# Patient Record
Sex: FEMALE | Race: BLACK OR AFRICAN AMERICAN | NOT HISPANIC OR LATINO
[De-identification: names, ages, dates, MRNs, and addresses within clinical notes are randomized per-mention and may not be internally consistent; named-entity substitution may affect disease eponyms.]

---

## 2020-05-29 PROBLEM — Z00.00 ENCOUNTER FOR PREVENTIVE HEALTH EXAMINATION: Status: ACTIVE | Noted: 2020-05-29

## 2020-06-04 ENCOUNTER — LABORATORY RESULT (OUTPATIENT)
Age: 40
End: 2020-06-04

## 2020-06-05 ENCOUNTER — APPOINTMENT (OUTPATIENT)
Dept: PULMONOLOGY | Facility: CLINIC | Age: 40
End: 2020-06-05
Payer: COMMERCIAL

## 2020-06-05 VITALS
WEIGHT: 196 LBS | BODY MASS INDEX: 32.65 KG/M2 | OXYGEN SATURATION: 98 % | TEMPERATURE: 99 F | HEART RATE: 76 BPM | SYSTOLIC BLOOD PRESSURE: 112 MMHG | HEIGHT: 65 IN | DIASTOLIC BLOOD PRESSURE: 81 MMHG

## 2020-06-05 DIAGNOSIS — J45.40 MODERATE PERSISTENT ASTHMA, UNCOMPLICATED: ICD-10-CM

## 2020-06-05 PROCEDURE — 99204 OFFICE O/P NEW MOD 45 MIN: CPT

## 2020-06-05 NOTE — ASSESSMENT
[FreeTextEntry1] : moderate persistent asthma\par \par Doing well on Breo with rare GEORGE use.  May be adequate for now.  Get IgE, check eosinophils on CBC. Advised to avoid all NSAIDs.  Foolwed by allergist, has had skin testing but no blood work recently.  [facility]

## 2020-06-05 NOTE — HISTORY OF PRESENT ILLNESS
[TextBox_4] : 39 yr old F her for evaluation of asthma.  First dx asthma about 5 yrs ago. Admitted on 2 occasions for about a week over past two years. Last steroid use 2 months ago after seen at urgent care center. Has been using GEORGE (albuterol) alone, until past month has been using Breo daily- since then has needed much less frequent albuterol.\par \par Has had allergy evaluation, told of allergies to mold, roaches, dogs (mild)- has a dog.  Possible anaphylactic rxn to Meloxicam, assumed allergic to NSAIDs.  Has nebulizer at home, no recent use. \par \par Recently having back pain, both low back to L leg and upper back, following a fall.

## 2020-06-05 NOTE — PHYSICAL EXAM
[No Acute Distress] : no acute distress [Normal Oropharynx] : normal oropharynx [No Neck Mass] : no neck mass [Normal Appearance] : normal appearance [Normal S1, S2] : normal s1, s2 [Normal Rate/Rhythm] : normal rate/rhythm [No Murmurs] : no murmurs [No Resp Distress] : no resp distress [Clear to Auscultation Bilaterally] : clear to auscultation bilaterally [No Abnormalities] : no abnormalities [Normal Gait] : normal gait [Benign] : benign [No Clubbing] : no clubbing [No Cyanosis] : no cyanosis [No Edema] : no edema [FROM] : FROM [Normal Color/ Pigmentation] : normal color/ pigmentation [No Focal Deficits] : no focal deficits [Normal Affect] : normal affect [Oriented x3] : oriented x3 [TextBox_68] : wheeze forced expiration only

## 2020-06-08 ENCOUNTER — TRANSCRIPTION ENCOUNTER (OUTPATIENT)
Age: 40
End: 2020-06-08

## 2020-06-09 LAB
BASOPHILS # BLD AUTO: 0 K/UL
BASOPHILS NFR BLD AUTO: 0 %
EOSINOPHIL # BLD AUTO: 0.72 K/UL
EOSINOPHIL NFR BLD AUTO: 8.8 %
HCT VFR BLD CALC: 36 %
HGB BLD-MCNC: 10.2 G/DL
LYMPHOCYTES # BLD AUTO: 2.66 K/UL
LYMPHOCYTES NFR BLD AUTO: 32.4 %
MAN DIFF?: NORMAL
MCHC RBC-ENTMCNC: 20.9 PG
MCHC RBC-ENTMCNC: 28.3 GM/DL
MCV RBC AUTO: 73.6 FL
MONOCYTES # BLD AUTO: 0.36 K/UL
MONOCYTES NFR BLD AUTO: 4.4 %
NEUTROPHILS # BLD AUTO: 4.47 K/UL
NEUTROPHILS NFR BLD AUTO: 54.4 %
PLATELET # BLD AUTO: 584 K/UL
RBC # BLD: 4.89 M/UL
RBC # FLD: 20.3 %
WBC # FLD AUTO: 8.21 K/UL

## 2020-06-10 LAB — IGE AB SERPL QL: 28 NG/ML

## 2020-06-17 ENCOUNTER — APPOINTMENT (OUTPATIENT)
Dept: SPINE | Facility: CLINIC | Age: 40
End: 2020-06-17
Payer: COMMERCIAL

## 2020-06-17 ENCOUNTER — APPOINTMENT (OUTPATIENT)
Dept: SPINE | Facility: CLINIC | Age: 40
End: 2020-06-17

## 2020-06-17 VITALS
DIASTOLIC BLOOD PRESSURE: 85 MMHG | WEIGHT: 200 LBS | OXYGEN SATURATION: 99 % | HEART RATE: 84 BPM | BODY MASS INDEX: 33.32 KG/M2 | SYSTOLIC BLOOD PRESSURE: 118 MMHG | HEIGHT: 65 IN

## 2020-06-17 PROCEDURE — 99243 OFF/OP CNSLTJ NEW/EST LOW 30: CPT | Mod: 57

## 2020-06-29 ENCOUNTER — APPOINTMENT (OUTPATIENT)
Dept: SPINE | Facility: HOSPITAL | Age: 40
End: 2020-06-29

## 2021-03-09 ENCOUNTER — NON-APPOINTMENT (OUTPATIENT)
Age: 41
End: 2021-03-09

## 2021-03-09 ENCOUNTER — APPOINTMENT (OUTPATIENT)
Dept: SPINE | Facility: CLINIC | Age: 41
End: 2021-03-09
Payer: MEDICAID

## 2021-03-09 ENCOUNTER — APPOINTMENT (OUTPATIENT)
Dept: SPINE | Facility: CLINIC | Age: 41
End: 2021-03-09

## 2021-03-09 VITALS
TEMPERATURE: 98.2 F | OXYGEN SATURATION: 98 % | WEIGHT: 220 LBS | DIASTOLIC BLOOD PRESSURE: 80 MMHG | SYSTOLIC BLOOD PRESSURE: 121 MMHG | BODY MASS INDEX: 36.65 KG/M2 | HEIGHT: 65 IN | HEART RATE: 77 BPM | RESPIRATION RATE: 14 BRPM

## 2021-03-09 DIAGNOSIS — M54.16 RADICULOPATHY, LUMBAR REGION: ICD-10-CM

## 2021-03-09 DIAGNOSIS — Z01.818 ENCOUNTER FOR OTHER PREPROCEDURAL EXAMINATION: ICD-10-CM

## 2021-03-09 PROCEDURE — 99214 OFFICE O/P EST MOD 30 MIN: CPT

## 2021-03-09 PROCEDURE — 99072 ADDL SUPL MATRL&STAF TM PHE: CPT

## 2021-03-11 PROBLEM — M54.16 LEFT LUMBAR RADICULOPATHY: Status: ACTIVE | Noted: 2020-06-16

## 2021-03-30 DIAGNOSIS — M54.12 RADICULOPATHY, CERVICAL REGION: ICD-10-CM

## 2021-04-04 ENCOUNTER — OUTPATIENT (OUTPATIENT)
Dept: OUTPATIENT SERVICES | Facility: HOSPITAL | Age: 41
LOS: 1 days | End: 2021-04-04
Payer: COMMERCIAL

## 2021-04-04 ENCOUNTER — APPOINTMENT (OUTPATIENT)
Dept: MRI IMAGING | Facility: HOSPITAL | Age: 41
End: 2021-04-04

## 2021-04-04 PROCEDURE — 72141 MRI NECK SPINE W/O DYE: CPT | Mod: 26

## 2021-04-04 PROCEDURE — 72141 MRI NECK SPINE W/O DYE: CPT

## 2021-04-05 ENCOUNTER — NON-APPOINTMENT (OUTPATIENT)
Age: 41
End: 2021-04-05

## 2021-04-05 RX ORDER — FLUTICASONE FUROATE AND VILANTEROL TRIFENATATE 200; 25 UG/1; UG/1
200-25 POWDER RESPIRATORY (INHALATION) DAILY
Qty: 1 | Refills: 3 | Status: ACTIVE | COMMUNITY

## 2021-04-06 ENCOUNTER — NON-APPOINTMENT (OUTPATIENT)
Age: 41
End: 2021-04-06

## 2021-04-06 LAB — SARS-COV-2 N GENE NPH QL NAA+PROBE: NOT DETECTED

## 2021-04-07 ENCOUNTER — TRANSCRIPTION ENCOUNTER (OUTPATIENT)
Age: 41
End: 2021-04-07

## 2021-04-07 VITALS
SYSTOLIC BLOOD PRESSURE: 118 MMHG | TEMPERATURE: 98 F | WEIGHT: 224.87 LBS | HEART RATE: 84 BPM | OXYGEN SATURATION: 100 % | HEIGHT: 65 IN | DIASTOLIC BLOOD PRESSURE: 81 MMHG | RESPIRATION RATE: 16 BRPM

## 2021-04-07 NOTE — ASU PATIENT PROFILE, ADULT - PMH
Anemia    Asthma    COVID-19  dec 2020  Heavy menses    Lumbar disc disorder with myelopathy  herniation  Obesity    Radiculopathy affecting upper extremity    Sciatica

## 2021-04-08 ENCOUNTER — APPOINTMENT (OUTPATIENT)
Dept: SPINE | Facility: HOSPITAL | Age: 41
End: 2021-04-08

## 2021-04-08 ENCOUNTER — INPATIENT (INPATIENT)
Facility: HOSPITAL | Age: 41
LOS: 5 days | Discharge: HOME HEALTH | DRG: 519 | End: 2021-04-14
Attending: NEUROLOGICAL SURGERY | Admitting: NEUROLOGICAL SURGERY
Payer: COMMERCIAL

## 2021-04-08 LAB
ANION GAP SERPL CALC-SCNC: 13 MMOL/L — SIGNIFICANT CHANGE UP (ref 5–17)
BUN SERPL-MCNC: 10 MG/DL — SIGNIFICANT CHANGE UP (ref 7–23)
CALCIUM SERPL-MCNC: 8.5 MG/DL — SIGNIFICANT CHANGE UP (ref 8.4–10.5)
CHLORIDE SERPL-SCNC: 100 MMOL/L — SIGNIFICANT CHANGE UP (ref 96–108)
CO2 SERPL-SCNC: 21 MMOL/L — LOW (ref 22–31)
CREAT SERPL-MCNC: 0.61 MG/DL — SIGNIFICANT CHANGE UP (ref 0.5–1.3)
GLUCOSE BLDC GLUCOMTR-MCNC: 129 MG/DL — HIGH (ref 70–99)
GLUCOSE SERPL-MCNC: 142 MG/DL — HIGH (ref 70–99)
HCT VFR BLD CALC: 37.4 % — SIGNIFICANT CHANGE UP (ref 34.5–45)
HGB BLD-MCNC: 11.6 G/DL — SIGNIFICANT CHANGE UP (ref 11.5–15.5)
MCHC RBC-ENTMCNC: 23.5 PG — LOW (ref 27–34)
MCHC RBC-ENTMCNC: 31 GM/DL — LOW (ref 32–36)
MCV RBC AUTO: 75.7 FL — LOW (ref 80–100)
NRBC # BLD: 0 /100 WBCS — SIGNIFICANT CHANGE UP (ref 0–0)
PLATELET # BLD AUTO: 469 K/UL — HIGH (ref 150–400)
POTASSIUM SERPL-MCNC: 4.1 MMOL/L — SIGNIFICANT CHANGE UP (ref 3.5–5.3)
POTASSIUM SERPL-SCNC: 4.1 MMOL/L — SIGNIFICANT CHANGE UP (ref 3.5–5.3)
RBC # BLD: 4.94 M/UL — SIGNIFICANT CHANGE UP (ref 3.8–5.2)
RBC # FLD: 17.9 % — HIGH (ref 10.3–14.5)
SODIUM SERPL-SCNC: 134 MMOL/L — LOW (ref 135–145)
WBC # BLD: 18.93 K/UL — HIGH (ref 3.8–10.5)
WBC # FLD AUTO: 18.93 K/UL — HIGH (ref 3.8–10.5)

## 2021-04-08 PROCEDURE — 99024 POSTOP FOLLOW-UP VISIT: CPT

## 2021-04-08 PROCEDURE — 63047 LAM FACETEC & FORAMOT LUMBAR: CPT | Mod: 22

## 2021-04-08 RX ORDER — DEXTROSE 50 % IN WATER 50 %
25 SYRINGE (ML) INTRAVENOUS ONCE
Refills: 0 | Status: DISCONTINUED | OUTPATIENT
Start: 2021-04-08 | End: 2021-04-09

## 2021-04-08 RX ORDER — METHOCARBAMOL 500 MG/1
500 TABLET, FILM COATED ORAL EVERY 8 HOURS
Refills: 0 | Status: DISCONTINUED | OUTPATIENT
Start: 2021-04-08 | End: 2021-04-09

## 2021-04-08 RX ORDER — CHLORHEXIDINE GLUCONATE 213 G/1000ML
1 SOLUTION TOPICAL ONCE
Refills: 0 | Status: DISCONTINUED | OUTPATIENT
Start: 2021-04-08 | End: 2021-04-09

## 2021-04-08 RX ORDER — DEXAMETHASONE 0.5 MG/5ML
4 ELIXIR ORAL EVERY 6 HOURS
Refills: 0 | Status: COMPLETED | OUTPATIENT
Start: 2021-04-08 | End: 2021-04-11

## 2021-04-08 RX ORDER — SODIUM CHLORIDE 9 MG/ML
1000 INJECTION INTRAMUSCULAR; INTRAVENOUS; SUBCUTANEOUS
Refills: 0 | Status: DISCONTINUED | OUTPATIENT
Start: 2021-04-08 | End: 2021-04-09

## 2021-04-08 RX ORDER — MAGNESIUM HYDROXIDE 400 MG/1
30 TABLET, CHEWABLE ORAL ONCE
Refills: 0 | Status: DISCONTINUED | OUTPATIENT
Start: 2021-04-08 | End: 2021-04-09

## 2021-04-08 RX ORDER — INSULIN LISPRO 100/ML
VIAL (ML) SUBCUTANEOUS ONCE
Refills: 0 | Status: COMPLETED | OUTPATIENT
Start: 2021-04-08 | End: 2021-04-08

## 2021-04-08 RX ORDER — ACETAMINOPHEN 500 MG
1000 TABLET ORAL ONCE
Refills: 0 | Status: COMPLETED | OUTPATIENT
Start: 2021-04-08 | End: 2021-04-08

## 2021-04-08 RX ORDER — HYDROMORPHONE HYDROCHLORIDE 2 MG/ML
0.25 INJECTION INTRAMUSCULAR; INTRAVENOUS; SUBCUTANEOUS
Refills: 0 | Status: DISCONTINUED | OUTPATIENT
Start: 2021-04-08 | End: 2021-04-09

## 2021-04-08 RX ORDER — SENNA PLUS 8.6 MG/1
2 TABLET ORAL ONCE
Refills: 0 | Status: DISCONTINUED | OUTPATIENT
Start: 2021-04-08 | End: 2021-04-09

## 2021-04-08 RX ORDER — POVIDONE-IODINE 5 %
1 AEROSOL (ML) TOPICAL ONCE
Refills: 0 | Status: DISCONTINUED | OUTPATIENT
Start: 2021-04-08 | End: 2021-04-14

## 2021-04-08 RX ORDER — CELECOXIB 200 MG/1
200 CAPSULE ORAL ONCE
Refills: 0 | Status: COMPLETED | OUTPATIENT
Start: 2021-04-08 | End: 2021-04-08

## 2021-04-08 RX ORDER — DEXTROSE 50 % IN WATER 50 %
15 SYRINGE (ML) INTRAVENOUS ONCE
Refills: 0 | Status: DISCONTINUED | OUTPATIENT
Start: 2021-04-08 | End: 2021-04-09

## 2021-04-08 RX ORDER — OXYCODONE HYDROCHLORIDE 5 MG/1
5 TABLET ORAL EVERY 4 HOURS
Refills: 0 | Status: DISCONTINUED | OUTPATIENT
Start: 2021-04-08 | End: 2021-04-14

## 2021-04-08 RX ORDER — ALBUTEROL 90 UG/1
2 AEROSOL, METERED ORAL ONCE
Refills: 0 | Status: DISCONTINUED | OUTPATIENT
Start: 2021-04-08 | End: 2021-04-09

## 2021-04-08 RX ORDER — OXYCODONE HYDROCHLORIDE 5 MG/1
10 TABLET ORAL EVERY 4 HOURS
Refills: 0 | Status: DISCONTINUED | OUTPATIENT
Start: 2021-04-08 | End: 2021-04-14

## 2021-04-08 RX ORDER — GABAPENTIN 400 MG/1
300 CAPSULE ORAL ONCE
Refills: 0 | Status: COMPLETED | OUTPATIENT
Start: 2021-04-08 | End: 2021-04-08

## 2021-04-08 RX ORDER — DEXTROSE 50 % IN WATER 50 %
12.5 SYRINGE (ML) INTRAVENOUS ONCE
Refills: 0 | Status: DISCONTINUED | OUTPATIENT
Start: 2021-04-08 | End: 2021-04-09

## 2021-04-08 RX ORDER — APREPITANT 80 MG/1
40 CAPSULE ORAL ONCE
Refills: 0 | Status: COMPLETED | OUTPATIENT
Start: 2021-04-08 | End: 2021-04-08

## 2021-04-08 RX ORDER — BUDESONIDE AND FORMOTEROL FUMARATE DIHYDRATE 160; 4.5 UG/1; UG/1
2 AEROSOL RESPIRATORY (INHALATION) ONCE
Refills: 0 | Status: DISCONTINUED | OUTPATIENT
Start: 2021-04-08 | End: 2021-04-09

## 2021-04-08 RX ORDER — LORATADINE 10 MG/1
10 TABLET ORAL ONCE
Refills: 0 | Status: DISCONTINUED | OUTPATIENT
Start: 2021-04-08 | End: 2021-04-09

## 2021-04-08 RX ORDER — CEFAZOLIN SODIUM 1 G
2000 VIAL (EA) INJECTION ONCE
Refills: 0 | Status: COMPLETED | OUTPATIENT
Start: 2021-04-08 | End: 2021-04-08

## 2021-04-08 RX ORDER — GLUCAGON INJECTION, SOLUTION 0.5 MG/.1ML
1 INJECTION, SOLUTION SUBCUTANEOUS ONCE
Refills: 0 | Status: DISCONTINUED | OUTPATIENT
Start: 2021-04-08 | End: 2021-04-09

## 2021-04-08 RX ORDER — SODIUM CHLORIDE 9 MG/ML
1000 INJECTION, SOLUTION INTRAVENOUS
Refills: 0 | Status: DISCONTINUED | OUTPATIENT
Start: 2021-04-08 | End: 2021-04-09

## 2021-04-08 RX ORDER — PANTOPRAZOLE SODIUM 20 MG/1
40 TABLET, DELAYED RELEASE ORAL ONCE
Refills: 0 | Status: DISCONTINUED | OUTPATIENT
Start: 2021-04-08 | End: 2021-04-09

## 2021-04-08 RX ORDER — ONDANSETRON 8 MG/1
4 TABLET, FILM COATED ORAL EVERY 6 HOURS
Refills: 0 | Status: DISCONTINUED | OUTPATIENT
Start: 2021-04-08 | End: 2021-04-14

## 2021-04-08 RX ORDER — BUPIVACAINE 13.3 MG/ML
20 INJECTION, SUSPENSION, LIPOSOMAL INFILTRATION ONCE
Refills: 0 | Status: DISCONTINUED | OUTPATIENT
Start: 2021-04-08 | End: 2021-04-14

## 2021-04-08 RX ADMIN — ONDANSETRON 4 MILLIGRAM(S): 8 TABLET, FILM COATED ORAL at 17:45

## 2021-04-08 RX ADMIN — Medication 100 MILLIGRAM(S): at 15:32

## 2021-04-08 RX ADMIN — APREPITANT 40 MILLIGRAM(S): 80 CAPSULE ORAL at 07:18

## 2021-04-08 RX ADMIN — OXYCODONE HYDROCHLORIDE 10 MILLIGRAM(S): 5 TABLET ORAL at 12:50

## 2021-04-08 RX ADMIN — Medication 50 MILLIGRAM(S): at 21:45

## 2021-04-08 RX ADMIN — Medication 1000 MILLIGRAM(S): at 18:15

## 2021-04-08 RX ADMIN — ONDANSETRON 4 MILLIGRAM(S): 8 TABLET, FILM COATED ORAL at 23:49

## 2021-04-08 RX ADMIN — METHOCARBAMOL 500 MILLIGRAM(S): 500 TABLET, FILM COATED ORAL at 21:45

## 2021-04-08 RX ADMIN — Medication 4 MILLIGRAM(S): at 14:01

## 2021-04-08 RX ADMIN — OXYCODONE HYDROCHLORIDE 10 MILLIGRAM(S): 5 TABLET ORAL at 23:49

## 2021-04-08 RX ADMIN — Medication 0: at 12:08

## 2021-04-08 RX ADMIN — Medication 50 MILLIGRAM(S): at 13:59

## 2021-04-08 RX ADMIN — Medication 4 MILLIGRAM(S): at 19:32

## 2021-04-08 RX ADMIN — GABAPENTIN 300 MILLIGRAM(S): 400 CAPSULE ORAL at 07:17

## 2021-04-08 RX ADMIN — Medication 1000 MILLIGRAM(S): at 17:45

## 2021-04-08 NOTE — H&P ADULT - NSHPPHYSICALEXAM_GEN_ALL_CORE
Constitutional:  41 y/o female awake, alert in no acute distress.  Eyes:  Sclera anicteric, conjunctiva noninjected.  ENMT: Oropharyngeal mucosa moist, pink. Tongue midline.    Neck: Neck supple, FROM.  No appreciable lymphadenopathy.  Back:  No pain to palpation/percussion of low back. No CVA tenderness.  Respiratory: Clear to auscultation bilaterally.  No rales, rhonchi, wheezes.  Cardiovascular: Regular rate and rhythm.  S1, S2 heard.  Gastrointestinal:  Soft, nontender, nondistended.  +BS.  Genitourinary:  Deferred.  Rectal: Deferred.  Vascular: Extremities warm, no ulcers, no discoloration of skin.   Neurological: Gen: AA&O x 3, conversant, appropriate.      CN II-XII grossly intact.    Motor: MENDEZ x 4, 5/5 throughout UE/LE.    Sens: Sensation intact to light touch throughout.    Hoffmans negative bilaterally.  Plantar downgoing bilaterally.  No clonus.      No pronator drift, no dysmetria.  Skin: Warm, dry, no erythema.

## 2021-04-08 NOTE — H&P ADULT - NSHPSOCIALHISTORY_GEN_ALL_CORE
Denies smoking cigarettes or alcohol use.    Smokes marijuana, last use over 1 week ago.    Has a 22 year old daughter, lives with brother.

## 2021-04-08 NOTE — H&P ADULT - NSICDXPASTMEDICALHX_GEN_ALL_CORE_FT
PAST MEDICAL HISTORY:  Anemia     Asthma     COVID-19 dec 2020    Heavy menses     Lumbar disc disorder with myelopathy herniation    Obesity     Radiculopathy affecting upper extremity     Sciatica

## 2021-04-08 NOTE — H&P ADULT - HISTORY OF PRESENT ILLNESS
41 y/o female with PMHx Asthma presents for spine surgery today.  Her history includes slip and fall 1/2020 with resulting low back pain and left leg pain managed conservatively.  In 9/2020 she started with right leg pain that has been worsening. 41 y/o female with PMHx Asthma presents for spine surgery today.  Her history includes slip and fall 1/2020 with resulting low back pain and left leg pain managed conservatively.  At that time she had a large L5/S1 disc herniation, which has resolved.  In 9/2020 she started with right leg pain that has been worsening.  She reports right greater than left leg pain.  She denies bowel or bladder changes, saddle anesthesia.  She denies weakness, numbness.

## 2021-04-08 NOTE — PROGRESS NOTE ADULT - SUBJECTIVE AND OBJECTIVE BOX
NEUROSURGERY POST OP NOTE:    POD# 0 S/P L4-5 lumbar laminectomy with discectomy    S: patient evaluated at bedside, b/l LE improved after surgery. C/o low back pain, non radiating. Denies any new numbness, weakness, pain, nausea, CP, SOB.       T(C): 36 (04-08-21 @ 10:42), Max: 36 (04-08-21 @ 10:42)  HR: 62 (04-08-21 @ 15:30) (54 - 71)  BP: 102/60 (04-08-21 @ 15:30) (94/52 - 985/-)  RR: 25 (04-08-21 @ 15:30) (10 - 25)  SpO2: 100% (04-08-21 @ 15:30) (98% - 100%)        ALBUTerol    90 MICROgram(s) HFA Inhaler 2 Puff(s) Inhalation Once PRN  budesonide 160 MICROgram(s)/formoterol 4.5 MICROgram(s) Inhaler 2 Puff(s) Inhalation Once  BUpivacaine liposome 1.3% Injectable (no eMAR) 20 milliLiter(s) Local Injection Once  chlorhexidine 2% Cloths 1 Application(s) Topical once  dexAMETHasone  Injectable 4 milliGRAM(s) IV Push every 6 hours  dextrose 40% Gel 15 Gram(s) Oral Once  dextrose 5%. 1000 milliLiter(s) IV Continuous <Continuous>  dextrose 5%. 1000 milliLiter(s) IV Continuous <Continuous>  dextrose 50% Injectable 25 Gram(s) IV Push Once  dextrose 50% Injectable 12.5 Gram(s) IV Push Once  dextrose 50% Injectable 25 Gram(s) IV Push Once  glucagon  Injectable 1 milliGRAM(s) IntraMuscular Once  HYDROmorphone  Injectable 0.25 milliGRAM(s) IV Push every 3 hours PRN  loratadine 10 milliGRAM(s) Oral Once  magnesium hydroxide Suspension 30 milliLiter(s) Oral Once PRN  multivitamin 1 Tablet(s) Oral Once  ondansetron   Disintegrating Tablet 4 milliGRAM(s) Oral every 6 hours  oxyCODONE    IR 5 milliGRAM(s) Oral every 4 hours PRN  oxyCODONE    IR 10 milliGRAM(s) Oral every 4 hours PRN  pantoprazole    Tablet 40 milliGRAM(s) Oral Once  povidone iodine 5% Nasal Swab 1 Application(s) Both Nostrils once  pregabalin 50 milliGRAM(s) Oral three times a day  senna 2 Tablet(s) Oral Once  sodium chloride 0.9%. 1000 milliLiter(s) IV Continuous <Continuous>      RADIOLOGY:     Exam:  Gen: laying in hospital bed, NAD  Neuro: AA+Ox3, OE spont, FC  CN II-XII: PERRL, EOMI  Motor: MAEx4, b/l hip flexion 3/5, b/l KF/PF/DF/EHl 5/5, b/l uppers 5/5 strength  sensation intact x4 ext  Cardiac: RRR  Pulm: CTAB  GI: soft, NT/ND  lumbar site midline C/D/I with clean dressing         Assessment:   39yo Female PMHx asthma, POD# 0 S/P L4-5 lumbar laminectomy with discectomy    Plan:  - neuro checks  - vitals checks  - pain control  - Decadron x3 days, DC on medrol dose pack  - ADAT  - bowel regimen  - GI ppx: protonix  - NS at 100  - DVT ppx: SCD's    d/w Dr. Ayers

## 2021-04-08 NOTE — BRIEF OPERATIVE NOTE - NSICDXBRIEFPROCEDURE_GEN_ALL_CORE_FT
PROCEDURES:  Lumbar laminectomy with discectomy by posterior approach 08-Apr-2021 10:28:21  Benjie Machado

## 2021-04-08 NOTE — CHART NOTE - NSCHARTNOTEFT_GEN_A_CORE
Neurosurgical Indications for Screening Dopplers on Admission:       1) Known hypercoagulation disorder (h/o VTE, thrombophilia, HIT, etc.)   2) Admitted from prolonged stay from another institution (straight forward ER transfers not included)  3) Presenting with significant leg immobility   4) Presenting with signs and symptoms of VTE?    5) With significant critical illness, Including "found down" for unknown period of time in HPI  6) With significant neurotrauma (TBI, SCI / TLS spine fractures)   7) Who are comatose   8) With known malignancy (e.g. glioblastoma multiforme, meningioma, etc.). Excludes IA chemo 23hr observation stays  9) On hemodialysis   10) Who have received platelet transfusion or antithrombotic reversal agents recently   11) Who have had recent major orthopedic surgery      "NO" to all 11.    Screening dopplers indicated?   Y _   N x    DVT Prophylaxis:  x SCD's   _ chemoprophylaxis    All above d/w attending.

## 2021-04-08 NOTE — H&P ADULT - ASSESSMENT
38 y/o female with PMHx Asthma with right lumbar radiculopathy for L4-5 Laminectomy/discectomy today:    - NPO  - 3M  - periop antibiotics  - admit for neuromonitoring postop  - scds    All above d/w Dr. Ayers.

## 2021-04-08 NOTE — PACU DISCHARGE NOTE - COMMENTS
Verbal report given to ALEE Loya, v/kia coates, pt, to be transferred to room 837, voicing no complaints.

## 2021-04-09 ENCOUNTER — TRANSCRIPTION ENCOUNTER (OUTPATIENT)
Age: 41
End: 2021-04-09

## 2021-04-09 LAB
A1C WITH ESTIMATED AVERAGE GLUCOSE RESULT: 5.6 % — SIGNIFICANT CHANGE UP (ref 4–5.6)
ANION GAP SERPL CALC-SCNC: 10 MMOL/L — SIGNIFICANT CHANGE UP (ref 5–17)
BUN SERPL-MCNC: 8 MG/DL — SIGNIFICANT CHANGE UP (ref 7–23)
CALCIUM SERPL-MCNC: 8.6 MG/DL — SIGNIFICANT CHANGE UP (ref 8.4–10.5)
CHLORIDE SERPL-SCNC: 102 MMOL/L — SIGNIFICANT CHANGE UP (ref 96–108)
CO2 SERPL-SCNC: 24 MMOL/L — SIGNIFICANT CHANGE UP (ref 22–31)
COVID-19 SPIKE DOMAIN AB INTERP: POSITIVE
COVID-19 SPIKE DOMAIN ANTIBODY RESULT: 33 U/ML — HIGH
CREAT SERPL-MCNC: 0.64 MG/DL — SIGNIFICANT CHANGE UP (ref 0.5–1.3)
ESTIMATED AVERAGE GLUCOSE: 114 MG/DL — SIGNIFICANT CHANGE UP (ref 68–114)
GLUCOSE SERPL-MCNC: 117 MG/DL — HIGH (ref 70–99)
HCT VFR BLD CALC: 35.9 % — SIGNIFICANT CHANGE UP (ref 34.5–45)
HGB BLD-MCNC: 11.1 G/DL — LOW (ref 11.5–15.5)
MAGNESIUM SERPL-MCNC: 2 MG/DL — SIGNIFICANT CHANGE UP (ref 1.6–2.6)
MCHC RBC-ENTMCNC: 23.4 PG — LOW (ref 27–34)
MCHC RBC-ENTMCNC: 30.9 GM/DL — LOW (ref 32–36)
MCV RBC AUTO: 75.7 FL — LOW (ref 80–100)
NRBC # BLD: 0 /100 WBCS — SIGNIFICANT CHANGE UP (ref 0–0)
PHOSPHATE SERPL-MCNC: 3.2 MG/DL — SIGNIFICANT CHANGE UP (ref 2.5–4.5)
PLATELET # BLD AUTO: 448 K/UL — HIGH (ref 150–400)
POTASSIUM SERPL-MCNC: 4.3 MMOL/L — SIGNIFICANT CHANGE UP (ref 3.5–5.3)
POTASSIUM SERPL-SCNC: 4.3 MMOL/L — SIGNIFICANT CHANGE UP (ref 3.5–5.3)
RBC # BLD: 4.74 M/UL — SIGNIFICANT CHANGE UP (ref 3.8–5.2)
RBC # FLD: 18.1 % — HIGH (ref 10.3–14.5)
SARS-COV-2 IGG+IGM SERPL QL IA: 33 U/ML — HIGH
SARS-COV-2 IGG+IGM SERPL QL IA: POSITIVE
SODIUM SERPL-SCNC: 136 MMOL/L — SIGNIFICANT CHANGE UP (ref 135–145)
WBC # BLD: 18.97 K/UL — HIGH (ref 3.8–10.5)
WBC # FLD AUTO: 18.97 K/UL — HIGH (ref 3.8–10.5)

## 2021-04-09 PROCEDURE — 99024 POSTOP FOLLOW-UP VISIT: CPT

## 2021-04-09 RX ORDER — SENNA PLUS 8.6 MG/1
2 TABLET ORAL DAILY
Refills: 0 | Status: DISCONTINUED | OUTPATIENT
Start: 2021-04-09 | End: 2021-04-14

## 2021-04-09 RX ORDER — PANTOPRAZOLE SODIUM 20 MG/1
40 TABLET, DELAYED RELEASE ORAL DAILY
Refills: 0 | Status: DISCONTINUED | OUTPATIENT
Start: 2021-04-09 | End: 2021-04-14

## 2021-04-09 RX ORDER — METHOCARBAMOL 500 MG/1
750 TABLET, FILM COATED ORAL EVERY 8 HOURS
Refills: 0 | Status: DISCONTINUED | OUTPATIENT
Start: 2021-04-09 | End: 2021-04-14

## 2021-04-09 RX ORDER — HYDROMORPHONE HYDROCHLORIDE 2 MG/ML
0.5 INJECTION INTRAMUSCULAR; INTRAVENOUS; SUBCUTANEOUS
Refills: 0 | Status: DISCONTINUED | OUTPATIENT
Start: 2021-04-09 | End: 2021-04-14

## 2021-04-09 RX ORDER — ALBUTEROL 90 UG/1
2 AEROSOL, METERED ORAL EVERY 6 HOURS
Refills: 0 | Status: DISCONTINUED | OUTPATIENT
Start: 2021-04-09 | End: 2021-04-14

## 2021-04-09 RX ORDER — BUDESONIDE AND FORMOTEROL FUMARATE DIHYDRATE 160; 4.5 UG/1; UG/1
2 AEROSOL RESPIRATORY (INHALATION) DAILY
Refills: 0 | Status: DISCONTINUED | OUTPATIENT
Start: 2021-04-09 | End: 2021-04-14

## 2021-04-09 RX ORDER — ENOXAPARIN SODIUM 100 MG/ML
40 INJECTION SUBCUTANEOUS
Refills: 0 | Status: DISCONTINUED | OUTPATIENT
Start: 2021-04-09 | End: 2021-04-14

## 2021-04-09 RX ORDER — LORATADINE 10 MG/1
10 TABLET ORAL DAILY
Refills: 0 | Status: DISCONTINUED | OUTPATIENT
Start: 2021-04-09 | End: 2021-04-14

## 2021-04-09 RX ORDER — MAGNESIUM HYDROXIDE 400 MG/1
30 TABLET, CHEWABLE ORAL DAILY
Refills: 0 | Status: DISCONTINUED | OUTPATIENT
Start: 2021-04-09 | End: 2021-04-12

## 2021-04-09 RX ORDER — ENOXAPARIN SODIUM 100 MG/ML
40 INJECTION SUBCUTANEOUS EVERY 24 HOURS
Refills: 0 | Status: DISCONTINUED | OUTPATIENT
Start: 2021-04-09 | End: 2021-04-09

## 2021-04-09 RX ADMIN — Medication 4 MILLIGRAM(S): at 01:17

## 2021-04-09 RX ADMIN — ENOXAPARIN SODIUM 40 MILLIGRAM(S): 100 INJECTION SUBCUTANEOUS at 21:36

## 2021-04-09 RX ADMIN — OXYCODONE HYDROCHLORIDE 10 MILLIGRAM(S): 5 TABLET ORAL at 17:51

## 2021-04-09 RX ADMIN — OXYCODONE HYDROCHLORIDE 10 MILLIGRAM(S): 5 TABLET ORAL at 23:03

## 2021-04-09 RX ADMIN — Medication 50 MILLIGRAM(S): at 21:36

## 2021-04-09 RX ADMIN — Medication 1 TABLET(S): at 12:57

## 2021-04-09 RX ADMIN — OXYCODONE HYDROCHLORIDE 10 MILLIGRAM(S): 5 TABLET ORAL at 18:27

## 2021-04-09 RX ADMIN — ONDANSETRON 4 MILLIGRAM(S): 8 TABLET, FILM COATED ORAL at 05:26

## 2021-04-09 RX ADMIN — LORATADINE 10 MILLIGRAM(S): 10 TABLET ORAL at 12:58

## 2021-04-09 RX ADMIN — OXYCODONE HYDROCHLORIDE 5 MILLIGRAM(S): 5 TABLET ORAL at 12:58

## 2021-04-09 RX ADMIN — OXYCODONE HYDROCHLORIDE 10 MILLIGRAM(S): 5 TABLET ORAL at 08:10

## 2021-04-09 RX ADMIN — OXYCODONE HYDROCHLORIDE 10 MILLIGRAM(S): 5 TABLET ORAL at 00:49

## 2021-04-09 RX ADMIN — OXYCODONE HYDROCHLORIDE 10 MILLIGRAM(S): 5 TABLET ORAL at 07:38

## 2021-04-09 RX ADMIN — Medication 50 MILLIGRAM(S): at 05:26

## 2021-04-09 RX ADMIN — PANTOPRAZOLE SODIUM 40 MILLIGRAM(S): 20 TABLET, DELAYED RELEASE ORAL at 12:57

## 2021-04-09 RX ADMIN — Medication 4 MILLIGRAM(S): at 13:01

## 2021-04-09 RX ADMIN — SENNA PLUS 2 TABLET(S): 8.6 TABLET ORAL at 12:58

## 2021-04-09 RX ADMIN — HYDROMORPHONE HYDROCHLORIDE 0.5 MILLIGRAM(S): 2 INJECTION INTRAMUSCULAR; INTRAVENOUS; SUBCUTANEOUS at 10:04

## 2021-04-09 RX ADMIN — Medication 4 MILLIGRAM(S): at 19:13

## 2021-04-09 RX ADMIN — METHOCARBAMOL 750 MILLIGRAM(S): 500 TABLET, FILM COATED ORAL at 13:01

## 2021-04-09 RX ADMIN — BUDESONIDE AND FORMOTEROL FUMARATE DIHYDRATE 2 PUFF(S): 160; 4.5 AEROSOL RESPIRATORY (INHALATION) at 12:57

## 2021-04-09 RX ADMIN — ONDANSETRON 4 MILLIGRAM(S): 8 TABLET, FILM COATED ORAL at 17:51

## 2021-04-09 RX ADMIN — METHOCARBAMOL 500 MILLIGRAM(S): 500 TABLET, FILM COATED ORAL at 05:26

## 2021-04-09 RX ADMIN — OXYCODONE HYDROCHLORIDE 5 MILLIGRAM(S): 5 TABLET ORAL at 13:30

## 2021-04-09 RX ADMIN — METHOCARBAMOL 750 MILLIGRAM(S): 500 TABLET, FILM COATED ORAL at 21:36

## 2021-04-09 RX ADMIN — HYDROMORPHONE HYDROCHLORIDE 0.5 MILLIGRAM(S): 2 INJECTION INTRAMUSCULAR; INTRAVENOUS; SUBCUTANEOUS at 10:20

## 2021-04-09 RX ADMIN — Medication 50 MILLIGRAM(S): at 13:00

## 2021-04-09 RX ADMIN — ONDANSETRON 4 MILLIGRAM(S): 8 TABLET, FILM COATED ORAL at 12:58

## 2021-04-09 RX ADMIN — Medication 4 MILLIGRAM(S): at 07:32

## 2021-04-09 RX ADMIN — ONDANSETRON 4 MILLIGRAM(S): 8 TABLET, FILM COATED ORAL at 23:04

## 2021-04-09 NOTE — PROGRESS NOTE ADULT - SUBJECTIVE AND OBJECTIVE BOX
HPI:  41 y/o female with PMHx Asthma presents for spine surgery today.  Her history includes slip and fall 1/2020 with resulting low back pain and left leg pain managed conservatively.  At that time she had a large L5/S1 disc herniation, which has resolved.  In 9/2020 she started with right leg pain that has been worsening.  She reports right greater than left leg pain.  She denies bowel or bladder changes, saddle anesthesia.  She denies weakness, numbness. (08 Apr 2021 06:47)    OVERNIGHT EVENTS: desat to 92, started NC 2L. HoTN 90s, 1L NS given. neuro stable    Hospital Course:   4/8: POD0 L4-5 lami/discectomy  4/9: POD1. desat 92 o/n, started on 2L NC. HoTN 90s, given 1L NS bolus. neuro stable.     Vital Signs Last 24 Hrs  T(C): 36.9 (09 Apr 2021 00:00), Max: 37.1 (08 Apr 2021 20:27)  T(F): 98.5 (09 Apr 2021 00:00), Max: 98.8 (08 Apr 2021 20:27)  HR: 61 (09 Apr 2021 00:00) (54 - 71)  BP: 100/64 (09 Apr 2021 00:00) (94/52 - 985/-)  BP(mean): 77 (08 Apr 2021 15:30) (67 - 86)  RR: 16 (09 Apr 2021 00:00) (10 - 25)  SpO2: 98% (09 Apr 2021 00:00) (97% - 100%)    I&O's Summary    08 Apr 2021 07:01  -  09 Apr 2021 04:35  --------------------------------------------------------  IN: 940 mL / OUT: 650 mL / NET: 290 mL        PHYSICAL EXAM:  GEN: laying in bed, appears well, NAD  NEURO: AOx3. FC, OE spont, speech intact, face symmetric. CNII-XII intact. PERRL, EOMI. No pronator drift. MAEx4. 5/5 strength throughout. SILT  CV: RRR +S1/S2  PULM: CTAB  GI: Abd soft, NT/ND  EXT: ext warm, dry, nontender  WOUND: lumbar incision c/d/i    TUBES/LINES:  [] Klein  [] Lumbar Drain  [] Wound Drains  [] Others      DIET:  [] NPO  [x] Mechanical  [] Tube feeds    LABS:                        11.6   18.93 )-----------( 469      ( 08 Apr 2021 20:20 )             37.4     04-08    134<L>  |  100  |  10  ----------------------------<  142<H>  4.1   |  21<L>  |  0.61    Ca    8.5      08 Apr 2021 20:19              CAPILLARY BLOOD GLUCOSE      POCT Blood Glucose.: 129 mg/dL (08 Apr 2021 12:05)      Drug Levels: [] N/A    CSF Analysis: [] N/A      Allergies    meloxicam (Short breath)    Intolerances      MEDICATIONS:  Antibiotics:    Neuro:  HYDROmorphone  Injectable 0.25 milliGRAM(s) IV Push every 3 hours PRN  methocarbamol 500 milliGRAM(s) Oral every 8 hours  ondansetron   Disintegrating Tablet 4 milliGRAM(s) Oral every 6 hours  oxyCODONE    IR 5 milliGRAM(s) Oral every 4 hours PRN  oxyCODONE    IR 10 milliGRAM(s) Oral every 4 hours PRN  pregabalin 50 milliGRAM(s) Oral three times a day    Anticoagulation:    OTHER:  ALBUTerol    90 MICROgram(s) HFA Inhaler 2 Puff(s) Inhalation Once PRN  budesonide 160 MICROgram(s)/formoterol 4.5 MICROgram(s) Inhaler 2 Puff(s) Inhalation Once  BUpivacaine liposome 1.3% Injectable (no eMAR) 20 milliLiter(s) Local Injection Once  chlorhexidine 2% Cloths 1 Application(s) Topical once  dexAMETHasone  Injectable 4 milliGRAM(s) IV Push every 6 hours  dextrose 40% Gel 15 Gram(s) Oral Once  dextrose 50% Injectable 25 Gram(s) IV Push Once  dextrose 50% Injectable 12.5 Gram(s) IV Push Once  dextrose 50% Injectable 25 Gram(s) IV Push Once  glucagon  Injectable 1 milliGRAM(s) IntraMuscular Once  loratadine 10 milliGRAM(s) Oral Once  magnesium hydroxide Suspension 30 milliLiter(s) Oral Once PRN  pantoprazole    Tablet 40 milliGRAM(s) Oral Once  povidone iodine 5% Nasal Swab 1 Application(s) Both Nostrils once  senna 2 Tablet(s) Oral Once    IVF:  dextrose 5%. 1000 milliLiter(s) IV Continuous <Continuous>  dextrose 5%. 1000 milliLiter(s) IV Continuous <Continuous>  multivitamin 1 Tablet(s) Oral Once  sodium chloride 0.9%. 1000 milliLiter(s) IV Continuous <Continuous>    CULTURES:    RADIOLOGY & ADDITIONAL TESTS:      ASSESSMENT:  39yo Female PMHx asthma, s/p  S/P L4-5 lumbar laminectomy with discectomy (4/8)    M51.26    Handoff    MEWS Score    Obesity    Asthma    Lumbar disc disorder with myelopathy    Radiculopathy affecting upper extremity    COVID-19    Anemia    Heavy menses    Sciatica    Lumbar disc herniation    Lumbar disc herniation    Lumbar laminectomy with discectomy by posterior approach    No significant past surgical history    SysAdmin_VstLnk        PLAN:  NEURO:  - neuro checks q4h  - pain control, ERAS  - decadron x 3 days, dc on medrol dose pack   - no drains, no imaging  - PT/OT    CARDIOVASCULAR:  - normotensive SBP goal     PULMONARY:  - IS  - NC prn  - cont albuterol/symicort    RENAL:  - repletions prn   - voiding    GI:  - regular diet  - bowel regimen   - gi ppx protonx while on steroids    HEME:  - h/h stable  - SCDs    ID:  - afebrile  - leukocytosis likely reactive post op/ secondary steroids    ENDO:  - glucose goal 140-180    DVT PROPHYLAXIS:  [x] Venodynes                                [] Heparin/Lovenox    DISPOSITION: full code, stable, dispo pending    D/w Dr. Ayers    Assessment:  Present when checked    []  GCS  E   V  M     Heart Failure: []Acute, [] acute on chronic , []chronic  Heart Failure:  [] Diastolic (HFpEF), [] Systolic (HFrEF), []Combined (HFpEF and HFrEF), [] RHF, [] Pulm HTN, [] Other    [] EUNICE, [] ATN, [] AIN, [] other  [] CKD1, [] CKD2, [] CKD 3, [] CKD 4, [] CKD 5, []ESRD    Encephalopathy: [] Metabolic, [] Hepatic, [] toxic, [] Neurological, [] Other    Abnormal Nurtitional Status: [] malnurtition (see nutrition note), [ ]underweight: BMI < 19, [] morbid obesity: BMI >40, [] Cachexia    [] Sepsis  [] hypovolemic shock,[] cardiogenic shock, [] hemorrhagic shock, [] neuogenic shock  [] Acute Respiratory Failure  []Cerebral edema, [] Brain compression/ herniation,   [] Functional quadriplegia  [] Acute blood loss anemia   HPI:  39 y/o female with PMHx Asthma presents for spine surgery today.  Her history includes slip and fall 1/2020 with resulting low back pain and left leg pain managed conservatively.  At that time she had a large L5/S1 disc herniation, which has resolved.  In 9/2020 she started with right leg pain that has been worsening.  She reports right greater than left leg pain.  She denies bowel or bladder changes, saddle anesthesia.  She denies weakness, numbness. (08 Apr 2021 06:47)    OVERNIGHT EVENTS: KEILA o/n, neuro stable    Hospital Course:   4/8: POD0 L4-5 lami/discectomy  4/9: POD1. KEILA o/n, neuro stable. decadron 4q6    Vital Signs Last 24 Hrs  T(C): 36.9 (09 Apr 2021 00:00), Max: 37.1 (08 Apr 2021 20:27)  T(F): 98.5 (09 Apr 2021 00:00), Max: 98.8 (08 Apr 2021 20:27)  HR: 61 (09 Apr 2021 00:00) (54 - 71)  BP: 100/64 (09 Apr 2021 00:00) (94/52 - 985/-)  BP(mean): 77 (08 Apr 2021 15:30) (67 - 86)  RR: 16 (09 Apr 2021 00:00) (10 - 25)  SpO2: 98% (09 Apr 2021 00:00) (97% - 100%)    I&O's Summary    08 Apr 2021 07:01  -  09 Apr 2021 04:35  --------------------------------------------------------  IN: 940 mL / OUT: 650 mL / NET: 290 mL        PHYSICAL EXAM:  GEN: laying in bed, appears well, NAD  NEURO: AOx3. FC, OE spont, speech intact, face symmetric. CNII-XII intact. PERRL, EOMI. No pronator drift. MAEx4. 5/5 strength throughout. SILT  CV: RRR +S1/S2  PULM: CTAB  GI: Abd soft, NT/ND  EXT: ext warm, dry, nontender  WOUND: lumbar incision c/d/i    TUBES/LINES:  [] Klein  [] Lumbar Drain  [] Wound Drains  [] Others      DIET:  [] NPO  [x] Mechanical  [] Tube feeds    LABS:                        11.6   18.93 )-----------( 469      ( 08 Apr 2021 20:20 )             37.4     04-08    134<L>  |  100  |  10  ----------------------------<  142<H>  4.1   |  21<L>  |  0.61    Ca    8.5      08 Apr 2021 20:19              CAPILLARY BLOOD GLUCOSE      POCT Blood Glucose.: 129 mg/dL (08 Apr 2021 12:05)      Drug Levels: [] N/A    CSF Analysis: [] N/A      Allergies    meloxicam (Short breath)    Intolerances      MEDICATIONS:  Antibiotics:    Neuro:  HYDROmorphone  Injectable 0.25 milliGRAM(s) IV Push every 3 hours PRN  methocarbamol 500 milliGRAM(s) Oral every 8 hours  ondansetron   Disintegrating Tablet 4 milliGRAM(s) Oral every 6 hours  oxyCODONE    IR 5 milliGRAM(s) Oral every 4 hours PRN  oxyCODONE    IR 10 milliGRAM(s) Oral every 4 hours PRN  pregabalin 50 milliGRAM(s) Oral three times a day    Anticoagulation:    OTHER:  ALBUTerol    90 MICROgram(s) HFA Inhaler 2 Puff(s) Inhalation Once PRN  budesonide 160 MICROgram(s)/formoterol 4.5 MICROgram(s) Inhaler 2 Puff(s) Inhalation Once  BUpivacaine liposome 1.3% Injectable (no eMAR) 20 milliLiter(s) Local Injection Once  chlorhexidine 2% Cloths 1 Application(s) Topical once  dexAMETHasone  Injectable 4 milliGRAM(s) IV Push every 6 hours  dextrose 40% Gel 15 Gram(s) Oral Once  dextrose 50% Injectable 25 Gram(s) IV Push Once  dextrose 50% Injectable 12.5 Gram(s) IV Push Once  dextrose 50% Injectable 25 Gram(s) IV Push Once  glucagon  Injectable 1 milliGRAM(s) IntraMuscular Once  loratadine 10 milliGRAM(s) Oral Once  magnesium hydroxide Suspension 30 milliLiter(s) Oral Once PRN  pantoprazole    Tablet 40 milliGRAM(s) Oral Once  povidone iodine 5% Nasal Swab 1 Application(s) Both Nostrils once  senna 2 Tablet(s) Oral Once    IVF:  dextrose 5%. 1000 milliLiter(s) IV Continuous <Continuous>  dextrose 5%. 1000 milliLiter(s) IV Continuous <Continuous>  multivitamin 1 Tablet(s) Oral Once  sodium chloride 0.9%. 1000 milliLiter(s) IV Continuous <Continuous>    CULTURES:    RADIOLOGY & ADDITIONAL TESTS:      ASSESSMENT:  41yo Female PMHx asthma, s/p  S/P L4-5 lumbar laminectomy with discectomy (4/8)    M51.26    Handoff    MEWS Score    Obesity    Asthma    Lumbar disc disorder with myelopathy    Radiculopathy affecting upper extremity    COVID-19    Anemia    Heavy menses    Sciatica    Lumbar disc herniation    Lumbar disc herniation    Lumbar laminectomy with discectomy by posterior approach    No significant past surgical history    SysAdmin_VstLnk        PLAN:  NEURO:  - neuro checks q4h  - pain control, ERAS  - decadron x 3 days, dc on medrol dose pack   - no drains, no imaging  - PT/OT    CARDIOVASCULAR:  - normotensive SBP goal     PULMONARY:  - IS  - NC prn  - cont albuterol/symicort    RENAL:  - repletions prn   - voiding    GI:  - regular diet  - bowel regimen   - gi ppx protonx while on steroids    HEME:  - h/h stable  - SCDs    ID:  - afebrile  - leukocytosis likely reactive post op/ secondary steroids    ENDO:  - glucose goal 140-180    DVT PROPHYLAXIS:  [x] Venodynes                                [] Heparin/Lovenox    DISPOSITION: full code, stable, dispo pending    D/w Dr. Ayers    Assessment:  Present when checked    []  GCS  E   V  M     Heart Failure: []Acute, [] acute on chronic , []chronic  Heart Failure:  [] Diastolic (HFpEF), [] Systolic (HFrEF), []Combined (HFpEF and HFrEF), [] RHF, [] Pulm HTN, [] Other    [] EUNICE, [] ATN, [] AIN, [] other  [] CKD1, [] CKD2, [] CKD 3, [] CKD 4, [] CKD 5, []ESRD    Encephalopathy: [] Metabolic, [] Hepatic, [] toxic, [] Neurological, [] Other    Abnormal Nurtitional Status: [] malnurtition (see nutrition note), [ ]underweight: BMI < 19, [] morbid obesity: BMI >40, [] Cachexia    [] Sepsis  [] hypovolemic shock,[] cardiogenic shock, [] hemorrhagic shock, [] neuogenic shock  [] Acute Respiratory Failure  []Cerebral edema, [] Brain compression/ herniation,   [] Functional quadriplegia  [] Acute blood loss anemia

## 2021-04-09 NOTE — PROGRESS NOTE ADULT - SUBJECTIVE AND OBJECTIVE BOX
HPI  40F asthma (x2 admissions in past) with h/o fall (01/2020) resulting left radicular LBP initially managed conservatively. Pt had a large L5/S1 disc herniation, which had resolved. In 09/2020 she started with right leg pain that had been worsening. Reports R>L leg pain. Denies bowel or bladder changes, saddle anesthesia. Denies weakness, numbness. Pt is now s/p an L4/5 laminectomy/discectomy (4/8/21).    Hospital Course:   4/8: POD0: L4-5 lami/discectomy  4/9: POD1: No acute o/n events. Reports incrs'd particularly this AM, sharp in low back. Denies radicular discomfort.    Vital Signs Last 24 Hrs  T(C): 36.6 (09 Apr 2021 08:21), Max: 37.1 (08 Apr 2021 20:27)  T(F): 97.9 (09 Apr 2021 08:21), Max: 98.8 (08 Apr 2021 20:27)  HR: 73 (09 Apr 2021 08:21) (54 - 73)  BP: 114/78 (09 Apr 2021 08:21) (94/52 - 985/-)  BP(mean): 77 (08 Apr 2021 15:30) (67 - 86)  RR: 16 (09 Apr 2021 08:21) (10 - 25)  SpO2: 99% (09 Apr 2021 08:21) (96% - 100%)    PHYSICAL EXAM:  GEN: laying in bed, appears well, NAD  NEURO: AOx3. FC, OE spont, speech intact, face symmetric. CNII-XII intact. PERRL, EOMI. No pronator drift. MAEx4. 5/5 strength throughout. SILT  CV: RRR +S1/S2  PULM: CTAB  GI: Abd soft, NT/ND  EXT: ext warm, dry, nontender  WOUND: lumbar incision c/d/i    LABS:  cret                        11.1   18.97 )-----------( 448      ( 09 Apr 2021 06:50 )             35.9     04-09    136  |  102  |  8   ----------------------------<  117<H>  4.3   |  24  |  0.64    Ca    8.6      09 Apr 2021 06:50  Phos  3.2     04-09  Mg     2.0     04-09    Allergies: meloxicam (shortness of breath, tachycardia)    MEDICATIONS  (STANDING):  budesonide 160 MICROgram(s)/formoterol 4.5 MICROgram(s) Inhaler 2 Puff(s) Inhalation daily  BUpivacaine liposome 1.3% Injectable (no eMAR) 20 milliLiter(s) Local Injection Once  dexAMETHasone  Injectable 4 milliGRAM(s) IV Push every 6 hours  enoxaparin Injectable 40 milliGRAM(s) SubCutaneous two times a day  loratadine 10 milliGRAM(s) Oral daily  methocarbamol 750 milliGRAM(s) Oral every 8 hours  multivitamin 1 Tablet(s) Oral daily  ondansetron   Disintegrating Tablet 4 milliGRAM(s) Oral every 6 hours  pantoprazole    Tablet 40 milliGRAM(s) Oral daily  povidone iodine 5% Nasal Swab 1 Application(s) Both Nostrils once  pregabalin 50 milliGRAM(s) Oral three times a day  senna 2 Tablet(s) Oral daily    MEDICATIONS  (PRN):  ALBUTerol    90 MICROgram(s) HFA Inhaler 2 Puff(s) Inhalation every 6 hours PRN Shortness of Breath and/or Wheezing  HYDROmorphone  Injectable 0.5 milliGRAM(s) IV Push every 3 hours PRN breakthrough pain  magnesium hydroxide Suspension 30 milliLiter(s) Oral daily PRN Constipation  oxyCODONE    IR 5 milliGRAM(s) Oral every 4 hours PRN Moderate Pain (4 - 6)  oxyCODONE    IR 10 milliGRAM(s) Oral every 4 hours PRN Severe Pain (7 - 10)    ASSESSMENT/PLAN  40F asthma (x2 admissions in past) now s/p an L4/5 laminectomy/discectomy (4/8/21).     - cont oxycodone 5-10mg q4h prn moderate-severe pain  - cont dilaudid 0.25mg IV q3h prn breakthrough pain  - cont pregabalin 50mg tid  - incrs methocarbamol to 750mg po q8h  - pain service will cont to follow

## 2021-04-09 NOTE — PHYSICAL THERAPY INITIAL EVALUATION ADULT - GAIT DEVIATIONS NOTED, PT EVAL
increased time in double stance/decreased velocity of limb motion/decreased step length/increased stride width

## 2021-04-09 NOTE — PHYSICAL THERAPY INITIAL EVALUATION ADULT - PERTINENT HX OF CURRENT PROBLEM, REHAB EVAL
40F asthma (x2 admissions in past) with h/o fall (01/2020) resulting left radicular LBP initially managed conservatively. Pt had a large L5/S1 disc herniation, which had resolved. In 09/2020 she started with right leg pain that had been worsening. Reports R>L leg pain. Denies bowel or bladder changes, saddle anesthesia. Denies weakness, numbness. Pt is now s/p an L4/5 laminectomy/discectomy

## 2021-04-09 NOTE — PHYSICAL THERAPY INITIAL EVALUATION ADULT - GENERAL OBSERVATIONS, REHAB EVAL
Patient received semi-hathaway in bed  in NAD on RA, +SCDs, +Heplock. Cleared by ALEE Loya. Agreeable to PT.

## 2021-04-09 NOTE — PHYSICAL THERAPY INITIAL EVALUATION ADULT - ADDITIONAL COMMENTS
Patient lives with 22y.o daughter on 5th floor of walk up apartment. Does not use any Assistive Devices at baseline.

## 2021-04-10 LAB
ANION GAP SERPL CALC-SCNC: 10 MMOL/L — SIGNIFICANT CHANGE UP (ref 5–17)
BUN SERPL-MCNC: 14 MG/DL — SIGNIFICANT CHANGE UP (ref 7–23)
CALCIUM SERPL-MCNC: 8.9 MG/DL — SIGNIFICANT CHANGE UP (ref 8.4–10.5)
CHLORIDE SERPL-SCNC: 96 MMOL/L — SIGNIFICANT CHANGE UP (ref 96–108)
CO2 SERPL-SCNC: 27 MMOL/L — SIGNIFICANT CHANGE UP (ref 22–31)
CREAT SERPL-MCNC: 0.68 MG/DL — SIGNIFICANT CHANGE UP (ref 0.5–1.3)
GLUCOSE SERPL-MCNC: 109 MG/DL — HIGH (ref 70–99)
HCT VFR BLD CALC: 34.8 % — SIGNIFICANT CHANGE UP (ref 34.5–45)
HGB BLD-MCNC: 10.8 G/DL — LOW (ref 11.5–15.5)
MAGNESIUM SERPL-MCNC: 2.1 MG/DL — SIGNIFICANT CHANGE UP (ref 1.6–2.6)
MCHC RBC-ENTMCNC: 23.9 PG — LOW (ref 27–34)
MCHC RBC-ENTMCNC: 31 GM/DL — LOW (ref 32–36)
MCV RBC AUTO: 77.2 FL — LOW (ref 80–100)
NRBC # BLD: 0 /100 WBCS — SIGNIFICANT CHANGE UP (ref 0–0)
PHOSPHATE SERPL-MCNC: 3.2 MG/DL — SIGNIFICANT CHANGE UP (ref 2.5–4.5)
PLATELET # BLD AUTO: 421 K/UL — HIGH (ref 150–400)
POTASSIUM SERPL-MCNC: 4.1 MMOL/L — SIGNIFICANT CHANGE UP (ref 3.5–5.3)
POTASSIUM SERPL-SCNC: 4.1 MMOL/L — SIGNIFICANT CHANGE UP (ref 3.5–5.3)
RBC # BLD: 4.51 M/UL — SIGNIFICANT CHANGE UP (ref 3.8–5.2)
RBC # FLD: 18.4 % — HIGH (ref 10.3–14.5)
SODIUM SERPL-SCNC: 133 MMOL/L — LOW (ref 135–145)
WBC # BLD: 15.93 K/UL — HIGH (ref 3.8–10.5)
WBC # FLD AUTO: 15.93 K/UL — HIGH (ref 3.8–10.5)

## 2021-04-10 PROCEDURE — 99024 POSTOP FOLLOW-UP VISIT: CPT

## 2021-04-10 RX ORDER — METOCLOPRAMIDE HCL 10 MG
10 TABLET ORAL EVERY 6 HOURS
Refills: 0 | Status: DISCONTINUED | OUTPATIENT
Start: 2021-04-10 | End: 2021-04-14

## 2021-04-10 RX ORDER — POLYETHYLENE GLYCOL 3350 17 G/17G
17 POWDER, FOR SOLUTION ORAL DAILY
Refills: 0 | Status: DISCONTINUED | OUTPATIENT
Start: 2021-04-10 | End: 2021-04-14

## 2021-04-10 RX ADMIN — Medication 10 MILLIGRAM(S): at 10:07

## 2021-04-10 RX ADMIN — Medication 4 MILLIGRAM(S): at 19:34

## 2021-04-10 RX ADMIN — HYDROMORPHONE HYDROCHLORIDE 0.5 MILLIGRAM(S): 2 INJECTION INTRAMUSCULAR; INTRAVENOUS; SUBCUTANEOUS at 09:08

## 2021-04-10 RX ADMIN — HYDROMORPHONE HYDROCHLORIDE 0.5 MILLIGRAM(S): 2 INJECTION INTRAMUSCULAR; INTRAVENOUS; SUBCUTANEOUS at 15:33

## 2021-04-10 RX ADMIN — METHOCARBAMOL 750 MILLIGRAM(S): 500 TABLET, FILM COATED ORAL at 21:40

## 2021-04-10 RX ADMIN — Medication 4 MILLIGRAM(S): at 01:00

## 2021-04-10 RX ADMIN — Medication 10 MILLIGRAM(S): at 19:37

## 2021-04-10 RX ADMIN — OXYCODONE HYDROCHLORIDE 10 MILLIGRAM(S): 5 TABLET ORAL at 21:39

## 2021-04-10 RX ADMIN — BUDESONIDE AND FORMOTEROL FUMARATE DIHYDRATE 2 PUFF(S): 160; 4.5 AEROSOL RESPIRATORY (INHALATION) at 11:34

## 2021-04-10 RX ADMIN — OXYCODONE HYDROCHLORIDE 10 MILLIGRAM(S): 5 TABLET ORAL at 00:00

## 2021-04-10 RX ADMIN — Medication 50 MILLIGRAM(S): at 21:40

## 2021-04-10 RX ADMIN — OXYCODONE HYDROCHLORIDE 10 MILLIGRAM(S): 5 TABLET ORAL at 18:34

## 2021-04-10 RX ADMIN — HYDROMORPHONE HYDROCHLORIDE 0.5 MILLIGRAM(S): 2 INJECTION INTRAMUSCULAR; INTRAVENOUS; SUBCUTANEOUS at 09:23

## 2021-04-10 RX ADMIN — OXYCODONE HYDROCHLORIDE 10 MILLIGRAM(S): 5 TABLET ORAL at 17:34

## 2021-04-10 RX ADMIN — PANTOPRAZOLE SODIUM 40 MILLIGRAM(S): 20 TABLET, DELAYED RELEASE ORAL at 12:27

## 2021-04-10 RX ADMIN — Medication 1 TABLET(S): at 12:26

## 2021-04-10 RX ADMIN — OXYCODONE HYDROCHLORIDE 10 MILLIGRAM(S): 5 TABLET ORAL at 22:39

## 2021-04-10 RX ADMIN — HYDROMORPHONE HYDROCHLORIDE 0.5 MILLIGRAM(S): 2 INJECTION INTRAMUSCULAR; INTRAVENOUS; SUBCUTANEOUS at 23:25

## 2021-04-10 RX ADMIN — Medication 50 MILLIGRAM(S): at 13:44

## 2021-04-10 RX ADMIN — HYDROMORPHONE HYDROCHLORIDE 0.5 MILLIGRAM(S): 2 INJECTION INTRAMUSCULAR; INTRAVENOUS; SUBCUTANEOUS at 19:34

## 2021-04-10 RX ADMIN — METHOCARBAMOL 750 MILLIGRAM(S): 500 TABLET, FILM COATED ORAL at 05:09

## 2021-04-10 RX ADMIN — Medication 4 MILLIGRAM(S): at 13:44

## 2021-04-10 RX ADMIN — OXYCODONE HYDROCHLORIDE 10 MILLIGRAM(S): 5 TABLET ORAL at 07:47

## 2021-04-10 RX ADMIN — METHOCARBAMOL 750 MILLIGRAM(S): 500 TABLET, FILM COATED ORAL at 13:44

## 2021-04-10 RX ADMIN — HYDROMORPHONE HYDROCHLORIDE 0.5 MILLIGRAM(S): 2 INJECTION INTRAMUSCULAR; INTRAVENOUS; SUBCUTANEOUS at 15:18

## 2021-04-10 RX ADMIN — ONDANSETRON 4 MILLIGRAM(S): 8 TABLET, FILM COATED ORAL at 17:34

## 2021-04-10 RX ADMIN — ONDANSETRON 4 MILLIGRAM(S): 8 TABLET, FILM COATED ORAL at 05:10

## 2021-04-10 RX ADMIN — ENOXAPARIN SODIUM 40 MILLIGRAM(S): 100 INJECTION SUBCUTANEOUS at 05:10

## 2021-04-10 RX ADMIN — OXYCODONE HYDROCHLORIDE 10 MILLIGRAM(S): 5 TABLET ORAL at 12:27

## 2021-04-10 RX ADMIN — ONDANSETRON 4 MILLIGRAM(S): 8 TABLET, FILM COATED ORAL at 12:27

## 2021-04-10 RX ADMIN — OXYCODONE HYDROCHLORIDE 10 MILLIGRAM(S): 5 TABLET ORAL at 13:27

## 2021-04-10 RX ADMIN — MAGNESIUM HYDROXIDE 30 MILLILITER(S): 400 TABLET, CHEWABLE ORAL at 23:47

## 2021-04-10 RX ADMIN — Medication 4 MILLIGRAM(S): at 07:47

## 2021-04-10 RX ADMIN — SENNA PLUS 2 TABLET(S): 8.6 TABLET ORAL at 12:26

## 2021-04-10 RX ADMIN — LORATADINE 10 MILLIGRAM(S): 10 TABLET ORAL at 12:26

## 2021-04-10 RX ADMIN — OXYCODONE HYDROCHLORIDE 10 MILLIGRAM(S): 5 TABLET ORAL at 08:30

## 2021-04-10 RX ADMIN — Medication 50 MILLIGRAM(S): at 05:10

## 2021-04-10 RX ADMIN — HYDROMORPHONE HYDROCHLORIDE 0.5 MILLIGRAM(S): 2 INJECTION INTRAMUSCULAR; INTRAVENOUS; SUBCUTANEOUS at 19:45

## 2021-04-10 RX ADMIN — ENOXAPARIN SODIUM 40 MILLIGRAM(S): 100 INJECTION SUBCUTANEOUS at 17:34

## 2021-04-10 RX ADMIN — HYDROMORPHONE HYDROCHLORIDE 0.5 MILLIGRAM(S): 2 INJECTION INTRAMUSCULAR; INTRAVENOUS; SUBCUTANEOUS at 23:55

## 2021-04-10 RX ADMIN — ONDANSETRON 4 MILLIGRAM(S): 8 TABLET, FILM COATED ORAL at 23:17

## 2021-04-10 NOTE — DISCHARGE NOTE PROVIDER - CARE PROVIDER_API CALL
Hakan Ayers)  Neurosurgery  [address], [place] [facility]  Phone: [phone]  Fax: [phone]  Follow Up Time:    Hakan Ayers)  Neurosurgery  [address] [address]  Phone: [phone]  Fax: [phone]  Follow Up Time:     Rich Snowden)  Anesthesiology; Pain Medicine  [address] [place] [address]  Phone: [phone]  Fax: [phone]  Follow Up Time:

## 2021-04-10 NOTE — DISCHARGE NOTE PROVIDER - HOSPITAL COURSE
HPI:  39 y/o female with PMHx Asthma presents for spine surgery today.  Her history includes slip and fall 1/2020 with resulting low back pain and left leg pain managed conservatively.  At that time she had a large L5/S1 disc herniation, which has resolved.  In 9/2020 she started with right leg pain that has been worsening.  She reports right greater than left leg pain.  She denies bowel or bladder changes, saddle anesthesia.  She denies weakness, numbness. (08 Apr 2021 06:47)    Hospital Course:   4/8: POD0 L4-5 lami/discectomy  4/9: POD1. KEILA o/n, neuro stable. decadron 4q6  4/10: POD2. KEILA. Neuro stable. Continuing decadron 4q6; to be discharged on medrol dosepak.      Patient neurologically and hemodynamically stable. Tolerating PO. Assessed by PT/OT and recommended for home without needs. Medically stable for discharge home. HPI:  41 y/o female with PMHx Asthma presents for spine surgery today.  Her history includes slip and fall 1/2020 with resulting low back pain and left leg pain managed conservatively.  At that time she had a large L5/S1 disc herniation, which has resolved.  In 9/2020 she started with right leg pain that has been worsening.  She reports right greater than left leg pain.  She denies bowel or bladder changes, saddle anesthesia.  She denies weakness, numbness. (08 Apr 2021 06:47)    Hospital Course:   4/8: POD0 L4-5 lami/discectomy  4/9: POD1. KEILA o/n, neuro stable. decadron 4q6  4/10: POD2. KEILA. Neuro stable. Continuing decadron 4q6; to be discharged on medrol dosepak.  4/11: POD 3. Worked with PT, had difficulty with stairs. Dispo home pending improvement with stairs.   4/12: POD 4. Pain controlled at rest, but severe pain with stairs. f/u pain management recs. for home with Home PT pending improvement with stairs.    Patient neurologically and hemodynamically stable. Tolerating PO. Assessed by PT/OT and recommended for home without needs. Medically stable for discharge home. HPI:  39 y/o female with PMHx Asthma presents for spine surgery today.  Her history includes slip and fall 1/2020 with resulting low back pain and left leg pain managed conservatively.  At that time she had a large L5/S1 disc herniation, which has resolved.  In 9/2020 she started with right leg pain that has been worsening.  She reports right greater than left leg pain.  She denies bowel or bladder changes, saddle anesthesia.  She denies weakness, numbness. (08 Apr 2021 06:47)    Hospital Course:   4/8: POD0 L4-5 lami/discectomy  4/9: POD1. KEILA o/n, neuro stable. decadron 4q6  4/10: POD2. KEILA. Neuro stable. Continuing decadron 4q6; to be discharged on medrol dosepak.  4/11: POD 3. Worked with PT, had difficulty with stairs. Dispo home pending improvement with stairs.   4/12: POD 4. Pain controlled at rest, but severe pain with stairs. f/u pain management recs. for home with Home PT pending improvement with stairs.  4/13: POD5. KEILA o/n, neuro stable. pending home with home PT    Patient neurologically and hemodynamically stable. Tolerating PO. Assessed by PT/OT and recommended for home without needs. Medically stable for discharge home.

## 2021-04-10 NOTE — DISCHARGE NOTE PROVIDER - NSDCFUADDAPPT_GEN_ALL_CORE_FT
Please call Dr. Ayers's office at [phone] to schedule your follow up appointment.     Please also make a follow up appointment with your primary care doctor. Please call Dr. Ayers's office at [phone] to schedule your follow up appointment.     Please call Dr. Snowden's office at [phone] to schedule your follow up appointment with pain management.  -  -Please also make a follow up appointment with your primary care doctor.

## 2021-04-10 NOTE — PROGRESS NOTE ADULT - SUBJECTIVE AND OBJECTIVE BOX
HPI:  39 y/o female with PMHx Asthma presents for spine surgery today.  Her history includes slip and fall 1/2020 with resulting low back pain and left leg pain managed conservatively.  At that time she had a large L5/S1 disc herniation, which has resolved.  In 9/2020 she started with right leg pain that has been worsening.  She reports right greater than left leg pain.  She denies bowel or bladder changes, saddle anesthesia.  She denies weakness, numbness. (08 Apr 2021 06:47)    OVERNIGHT EVENTS: KEILA o/n, neuro stable    Hospital Course:   4/8: POD0 L4-5 lami/discectomy  4/9: POD1. KEILA o/n, neuro stable. decadron 4q6  4/10: POD2. KEILA. Neuro stable. Continuing decadron 4q6; to be discharged on medrol dosepak.    Vital Signs Last 24 Hrs  T(C): 37.1 (09 Apr 2021 20:10), Max: 37.1 (09 Apr 2021 15:49)  T(F): 98.7 (09 Apr 2021 20:10), Max: 98.7 (09 Apr 2021 15:49)  HR: 66 (09 Apr 2021 20:10) (55 - 73)  BP: 106/71 (09 Apr 2021 20:10) (98/63 - 114/78)  BP(mean): --  RR: 17 (09 Apr 2021 20:10) (15 - 17)  SpO2: 97% (09 Apr 2021 20:10) (96% - 99%)    I&O's Summary    08 Apr 2021 07:01  -  09 Apr 2021 07:00  --------------------------------------------------------  IN: 1580 mL / OUT: 1100 mL / NET: 480 mL        PHYSICAL EXAM:  GEN: laying in bed, appears well, NAD  NEURO: AOx3. FC, OE spont, speech intact, face symmetric. CNII-XII intact. PERRL, EOMI. No pronator drift. MAEx4. 5/5 strength throughout. SILT  CV: RRR +S1/S2  PULM: CTAB  GI: Abd soft, NT/ND  EXT: ext warm, dry, nontender  WOUND: lumbar incision c/d/i      TUBES/LINES:  [] Klein  [] Lumbar Drain  [] Wound Drains  [] Others      DIET:  [] NPO  [x] Mechanical  [] Tube feeds    LABS:                        11.1   18.97 )-----------( 448      ( 09 Apr 2021 06:50 )             35.9     04-09    136  |  102  |  8   ----------------------------<  117<H>  4.3   |  24  |  0.64    Ca    8.6      09 Apr 2021 06:50  Phos  3.2     04-09  Mg     2.0     04-09              CAPILLARY BLOOD GLUCOSE          Drug Levels: [] N/A    CSF Analysis: [] N/A      Allergies    meloxicam (Short breath)    Intolerances      MEDICATIONS:  Antibiotics:    Neuro:  HYDROmorphone  Injectable 0.5 milliGRAM(s) IV Push every 3 hours PRN  methocarbamol 750 milliGRAM(s) Oral every 8 hours  ondansetron   Disintegrating Tablet 4 milliGRAM(s) Oral every 6 hours  oxyCODONE    IR 5 milliGRAM(s) Oral every 4 hours PRN  oxyCODONE    IR 10 milliGRAM(s) Oral every 4 hours PRN  pregabalin 50 milliGRAM(s) Oral three times a day    Anticoagulation:  enoxaparin Injectable 40 milliGRAM(s) SubCutaneous two times a day    OTHER:  ALBUTerol    90 MICROgram(s) HFA Inhaler 2 Puff(s) Inhalation every 6 hours PRN  budesonide 160 MICROgram(s)/formoterol 4.5 MICROgram(s) Inhaler 2 Puff(s) Inhalation daily  BUpivacaine liposome 1.3% Injectable (no eMAR) 20 milliLiter(s) Local Injection Once  dexAMETHasone  Injectable 4 milliGRAM(s) IV Push every 6 hours  loratadine 10 milliGRAM(s) Oral daily  magnesium hydroxide Suspension 30 milliLiter(s) Oral daily PRN  pantoprazole    Tablet 40 milliGRAM(s) Oral daily  povidone iodine 5% Nasal Swab 1 Application(s) Both Nostrils once  senna 2 Tablet(s) Oral daily    IVF:  multivitamin 1 Tablet(s) Oral daily    CULTURES:    RADIOLOGY & ADDITIONAL TESTS:      ASSESSMENT:  39yo Female PMHx asthma, s/p  S/P L4-5 lumbar laminectomy with discectomy (4/8)    M51.26    Handoff    MEWS Score    Obesity    Asthma    Lumbar disc disorder with myelopathy    Radiculopathy affecting upper extremity    COVID-19    Anemia    Heavy menses    Sciatica    Lumbar disc herniation    Lumbar disc herniation    Lumbar laminectomy with discectomy by posterior approach    No significant past surgical history    SysAdmin_VstLnk        PLAN:  NEURO:  - neuro checks q4h  - pain control, ERAS  - decadron x 3 days, dc on medrol dose pack   - no drains, no imaging  - PT/OT    CARDIOVASCULAR:  - normotensive SBP goal     PULMONARY:  - IS  - NC prn  - cont albuterol/symicort    RENAL:  - repletions prn   - voiding    GI:  - regular diet  - bowel regimen   - gi ppx protonx while on steroids    HEME:  - h/h stable  - SCDs    ID:  - afebrile  - leukocytosis likely reactive post op/ secondary steroids  - COVID Ab positive    ENDO:  - glucose goal 140-180    DVT PROPHYLAXIS:  [x] Venodynes                                [] Heparin/Lovenox    DISPOSITION: full code, stable, dispo pending  for home with no needs    D/w Dr. Ayers      Assessment:  Present when checked    []  GCS  E   V  M     Heart Failure: []Acute, [] acute on chronic , []chronic  Heart Failure:  [] Diastolic (HFpEF), [] Systolic (HFrEF), []Combined (HFpEF and HFrEF), [] RHF, [] Pulm HTN, [] Other    [] EUNICE, [] ATN, [] AIN, [] other  [] CKD1, [] CKD2, [] CKD 3, [] CKD 4, [] CKD 5, []ESRD    Encephalopathy: [] Metabolic, [] Hepatic, [] toxic, [] Neurological, [] Other    Abnormal Nurtitional Status: [] malnurtition (see nutrition note), [ ]underweight: BMI < 19, [] morbid obesity: BMI >40, [] Cachexia    [] Sepsis  [] hypovolemic shock,[] cardiogenic shock, [] hemorrhagic shock, [] neuogenic shock  [] Acute Respiratory Failure  []Cerebral edema, [] Brain compression/ herniation,   [] Functional quadriplegia  [] Acute blood loss anemia

## 2021-04-10 NOTE — DISCHARGE NOTE PROVIDER - NSDCMRMEDTOKEN_GEN_ALL_CORE_FT
albuterol 90 mcg/inh inhalation aerosol: 2 puff(s) inhaled every 6 hours, As Needed  Breo Ellipta 200 mcg-25 mcg/inh inhalation powder: 1 puff(s) inhaled once a day  ZyrTEC 10 mg oral tablet: 1 tab(s) orally once a day   albuterol 90 mcg/inh inhalation aerosol: 2 puff(s) inhaled every 6 hours, As Needed  Bedside commode:   Breo Ellipta 200 mcg-25 mcg/inh inhalation powder: 1 puff(s) inhaled once a day  Rolling walker:   ZyrTEC 10 mg oral tablet: 1 tab(s) orally once a day   albuterol 90 mcg/inh inhalation aerosol: 2 puff(s) inhaled every 6 hours, As Needed  Bedside commode:   Breo Ellipta 200 mcg-25 mcg/inh inhalation powder: 1 puff(s) inhaled once a day  budesonide-formoterol 160 mcg-4.5 mcg/inh inhalation aerosol:  inhaled   Innerclean oral tablet: 2 tab(s) orally once a day  loratadine 10 mg oral tablet: 1 tab(s) orally once a day  Medrol Dosepak 4 mg oral tablet: 1 tab(s) orally once a day (at bedtime) for 3 days then stop.  MiraLax oral powder for reconstitution: 17 gram(s) orally once a day  oxycodone-acetaminophen 5 mg-325 mg oral tablet: 1 tab(s) orally every 6 hours, As Needed -for severe pain MDD:4 tabs   pantoprazole 40 mg oral delayed release tablet: 1 tab(s) orally once a day while you are taking steroids.  Robaxin-750 oral tablet: 1 tab(s) orally every 8 hours, As Needed -for muscle spasm MDD:3 tablets  Rolling walker:   ZyrTEC 10 mg oral tablet: 1 tab(s) orally once a day

## 2021-04-10 NOTE — DISCHARGE NOTE PROVIDER - CARE PROVIDERS DIRECT ADDRESSES
[email or web address]

## 2021-04-10 NOTE — DISCHARGE NOTE PROVIDER - NSDCFUADDINST_GEN_ALL_CORE_FT
Wound Care:  1) You should wash your surgical incision site 24 hours after you get home, unless told not  to by your doctor.  2) Massage your incision site gently to remove any dried or crusted blood. Pat the wound  dry with a clean towel afterwards and leave the wound open to air. Do not apply creams  or ointments to the wound.  3) You should shower everyday as this will keep your wound clean and promote healing.  4) No tub baths or swimming for 2 weeks after your surgery.  5) Mild incision site swelling is common and should decrease as the days go by. If you  notice any wound drainage, redness, increasing swelling, or fever greater than 101F,  please notify your neurosurgeon immediately or go to the nearest emergency room.  6) Do not wear tight pants or clothing that can irritate your wound.    Wound:  1) If you notice any breakage to the drain or any leakage, please inform your doctor  immediately.      Activity Level:  1) Fatigue is common following spine surgery. Listen to your body and rest if you feel tired.  2) You should get up and walk around during the day time.  3) No bending, lifting, twisting or strenuous housework for the first 3-6 weeks after surgery  4) Hydrate yourself. Drink plenty of water.  5) If you notice any new weakness, tingling, or numbness of your extremities, changes in  speech or mental status, headaches, please notify your doctor immediately or go to the  nearest emergency room.    Pain Management:  1) You may be given a short course (5-7 days) of narcotic pain relievers such as percocet  or oxycodone to be taken as needed for moderate to severe pain. You may also be  given a muscle relaxant (e.g. valium, robaxin.)  2) These medications may cause drowsiness, nausea. Take after food and drink plenty of  water/high fiber foods. Do not drive or operate machinery while taking narcotics.  3) You should also take a stool softener (senna, colace) as narcotics may cause  constipation. These can be bought over-the-counter, without a prescription.  4) No not drink alcohol with these medications.  5) Ask your surgeon before taking nonsteroidal anti-inflammatory drugs (NSAIDs) such as  advil, ibuprofen, motrin, naproxen. NSAIDs may cause bleeding.    Other Medications Upon Discharge:  1) You may be prescribed a steroid (e.g. decadron, medrol dosepak) after spine surgery to  reduce surgical site swelling. Decadron (dexamethasone) may cause an increase in  appetite, irritability, difficulty sleeping, hiccups, increased blood sugars and increased  blood pressure. Please take as instructed by your doctor.  2) You should take an antacid (such as tums, pepcid, nexium) while on decadron as  steroids may irritate the gastrointestinal lining.  3) Resume your home medications such as those for high blood pressure, diabetes mellitus  etc, unless you are told not to by your doctor.  4) If you are taking aspirin or any blood-thinners, ask your doctor if it is safe to resume.    Follow-up Appointment:  1) Please call our office, [phone], to confirm your follow-up appointment.  2) If you have staples or sutures, those are normally removed 10-14 days after surgery,  during your follow-up with your doctor.  3) We recommend scheduling a follow-up appointment postoperatively with your PCP.  4) Your ongoing care plan will be discussed with your neurosurgeon during your follow-up. Wound Care:  1) You should wash your surgical incision site 24 hours after you get home, unless told not  to by your doctor.  2) Massage your incision site gently to remove any dried or crusted blood. Pat the wound  dry with a clean towel afterwards and leave the wound open to air. Do not apply creams  or ointments to the wound.  3) You should shower everyday as this will keep your wound clean and promote healing.  4) No tub baths or swimming for 2 weeks after your surgery.  5) Mild incision site swelling is common and should decrease as the days go by. If you  notice any wound drainage, redness, increasing swelling, or fever greater than 101F,  please notify your neurosurgeon immediately or go to the nearest emergency room.  6) Do not wear tight pants or clothing that can irritate your wound.    Wound:  1) If you notice any breakage to the drain or any leakage, please inform your doctor  immediately.      Activity Level:  1) Fatigue is common following spine surgery. Listen to your body and rest if you feel tired.  2) You should get up and walk around during the day time.  3) No bending, lifting, twisting or strenuous housework for the first 3-6 weeks after surgery  4) Hydrate yourself. Drink plenty of water.  5) If you notice any new weakness, tingling, or numbness of your extremities, changes in  speech or mental status, headaches, please notify your doctor immediately or go to the  nearest emergency room.    Pain Management:  1) You may be given a short course (5-7 days) of narcotic pain relievers such as percocet  or oxycodone to be taken as needed for moderate to severe pain. You may also be  given a muscle relaxant (e.g. valium, robaxin.)  2) These medications may cause drowsiness, nausea. Take after food and drink plenty of  water/high fiber foods. Do not drive or operate machinery while taking narcotics.  3) You should also take a stool softener (senna, colace) as narcotics may cause  constipation. These can be bought over-the-counter, without a prescription.  4) No not drink alcohol with these medications.  5) Ask your surgeon before taking nonsteroidal anti-inflammatory drugs (NSAIDs) such as  advil, ibuprofen, motrin, naproxen. NSAIDs may cause bleeding.    Other Medications Upon Discharge:  1) Continue taking medrol dosepack. This is steroids.  2) You should take an antacid (such as tums, pepcid, nexium) while on steroids as  steroids may irritate the gastrointestinal lining.  3) Resume your home medications such as those for high blood pressure, diabetes mellitus  etc, unless you are told not to by your doctor.    Follow-up Appointment:  1) Please call our office, [phone], to confirm your follow-up appointment.  2) If you have staples or sutures, those are normally removed 10-14 days after surgery,  during your follow-up with your doctor.

## 2021-04-10 NOTE — DISCHARGE NOTE PROVIDER - NSDCCPTREATMENT_GEN_ALL_CORE_FT
PRINCIPAL PROCEDURE  Procedure: Lumbar laminectomy with discectomy by posterior approach  Findings and Treatment: L4-L5

## 2021-04-10 NOTE — DISCHARGE NOTE PROVIDER - NSDCCPCAREPLAN_GEN_ALL_CORE_FT
PRINCIPAL DISCHARGE DIAGNOSIS  Diagnosis: Lumbar herniated disc  Assessment and Plan of Treatment:       SECONDARY DISCHARGE DIAGNOSES  Diagnosis: Mild persistent asthma  Assessment and Plan of Treatment:

## 2021-04-11 LAB
ANION GAP SERPL CALC-SCNC: 8 MMOL/L — SIGNIFICANT CHANGE UP (ref 5–17)
BUN SERPL-MCNC: 14 MG/DL — SIGNIFICANT CHANGE UP (ref 7–23)
CALCIUM SERPL-MCNC: 8.6 MG/DL — SIGNIFICANT CHANGE UP (ref 8.4–10.5)
CHLORIDE SERPL-SCNC: 98 MMOL/L — SIGNIFICANT CHANGE UP (ref 96–108)
CO2 SERPL-SCNC: 28 MMOL/L — SIGNIFICANT CHANGE UP (ref 22–31)
CREAT SERPL-MCNC: 0.69 MG/DL — SIGNIFICANT CHANGE UP (ref 0.5–1.3)
GLUCOSE SERPL-MCNC: 104 MG/DL — HIGH (ref 70–99)
HCT VFR BLD CALC: 35.6 % — SIGNIFICANT CHANGE UP (ref 34.5–45)
HGB BLD-MCNC: 10.8 G/DL — LOW (ref 11.5–15.5)
MAGNESIUM SERPL-MCNC: 2.2 MG/DL — SIGNIFICANT CHANGE UP (ref 1.6–2.6)
MCHC RBC-ENTMCNC: 23.1 PG — LOW (ref 27–34)
MCHC RBC-ENTMCNC: 30.3 GM/DL — LOW (ref 32–36)
MCV RBC AUTO: 76.2 FL — LOW (ref 80–100)
NRBC # BLD: 0 /100 WBCS — SIGNIFICANT CHANGE UP (ref 0–0)
PHOSPHATE SERPL-MCNC: 3.2 MG/DL — SIGNIFICANT CHANGE UP (ref 2.5–4.5)
PLATELET # BLD AUTO: 422 K/UL — HIGH (ref 150–400)
POTASSIUM SERPL-MCNC: 4.2 MMOL/L — SIGNIFICANT CHANGE UP (ref 3.5–5.3)
POTASSIUM SERPL-SCNC: 4.2 MMOL/L — SIGNIFICANT CHANGE UP (ref 3.5–5.3)
RBC # BLD: 4.67 M/UL — SIGNIFICANT CHANGE UP (ref 3.8–5.2)
RBC # FLD: 18.4 % — HIGH (ref 10.3–14.5)
SODIUM SERPL-SCNC: 134 MMOL/L — LOW (ref 135–145)
WBC # BLD: 13.14 K/UL — HIGH (ref 3.8–10.5)
WBC # FLD AUTO: 13.14 K/UL — HIGH (ref 3.8–10.5)

## 2021-04-11 PROCEDURE — 99024 POSTOP FOLLOW-UP VISIT: CPT

## 2021-04-11 RX ORDER — MINERAL OIL
133 OIL (ML) MISCELLANEOUS ONCE
Refills: 0 | Status: DISCONTINUED | OUTPATIENT
Start: 2021-04-11 | End: 2021-04-13

## 2021-04-11 RX ADMIN — OXYCODONE HYDROCHLORIDE 10 MILLIGRAM(S): 5 TABLET ORAL at 09:51

## 2021-04-11 RX ADMIN — POLYETHYLENE GLYCOL 3350 17 GRAM(S): 17 POWDER, FOR SOLUTION ORAL at 11:58

## 2021-04-11 RX ADMIN — MAGNESIUM HYDROXIDE 30 MILLILITER(S): 400 TABLET, CHEWABLE ORAL at 18:32

## 2021-04-11 RX ADMIN — Medication 50 MILLIGRAM(S): at 14:06

## 2021-04-11 RX ADMIN — METHOCARBAMOL 750 MILLIGRAM(S): 500 TABLET, FILM COATED ORAL at 05:50

## 2021-04-11 RX ADMIN — OXYCODONE HYDROCHLORIDE 10 MILLIGRAM(S): 5 TABLET ORAL at 17:12

## 2021-04-11 RX ADMIN — Medication 50 MILLIGRAM(S): at 23:02

## 2021-04-11 RX ADMIN — ONDANSETRON 4 MILLIGRAM(S): 8 TABLET, FILM COATED ORAL at 23:03

## 2021-04-11 RX ADMIN — METHOCARBAMOL 750 MILLIGRAM(S): 500 TABLET, FILM COATED ORAL at 23:02

## 2021-04-11 RX ADMIN — HYDROMORPHONE HYDROCHLORIDE 0.5 MILLIGRAM(S): 2 INJECTION INTRAMUSCULAR; INTRAVENOUS; SUBCUTANEOUS at 11:58

## 2021-04-11 RX ADMIN — OXYCODONE HYDROCHLORIDE 10 MILLIGRAM(S): 5 TABLET ORAL at 10:51

## 2021-04-11 RX ADMIN — ONDANSETRON 4 MILLIGRAM(S): 8 TABLET, FILM COATED ORAL at 05:51

## 2021-04-11 RX ADMIN — OXYCODONE HYDROCHLORIDE 10 MILLIGRAM(S): 5 TABLET ORAL at 01:52

## 2021-04-11 RX ADMIN — OXYCODONE HYDROCHLORIDE 10 MILLIGRAM(S): 5 TABLET ORAL at 06:50

## 2021-04-11 RX ADMIN — Medication 50 MILLIGRAM(S): at 05:51

## 2021-04-11 RX ADMIN — Medication 4 MILLIGRAM(S): at 07:48

## 2021-04-11 RX ADMIN — Medication 1 TABLET(S): at 11:58

## 2021-04-11 RX ADMIN — HYDROMORPHONE HYDROCHLORIDE 0.5 MILLIGRAM(S): 2 INJECTION INTRAMUSCULAR; INTRAVENOUS; SUBCUTANEOUS at 12:15

## 2021-04-11 RX ADMIN — Medication 4 MILLIGRAM(S): at 01:23

## 2021-04-11 RX ADMIN — OXYCODONE HYDROCHLORIDE 10 MILLIGRAM(S): 5 TABLET ORAL at 21:53

## 2021-04-11 RX ADMIN — ONDANSETRON 4 MILLIGRAM(S): 8 TABLET, FILM COATED ORAL at 11:58

## 2021-04-11 RX ADMIN — BUDESONIDE AND FORMOTEROL FUMARATE DIHYDRATE 2 PUFF(S): 160; 4.5 AEROSOL RESPIRATORY (INHALATION) at 11:59

## 2021-04-11 RX ADMIN — SENNA PLUS 2 TABLET(S): 8.6 TABLET ORAL at 11:58

## 2021-04-11 RX ADMIN — ENOXAPARIN SODIUM 40 MILLIGRAM(S): 100 INJECTION SUBCUTANEOUS at 18:12

## 2021-04-11 RX ADMIN — LORATADINE 10 MILLIGRAM(S): 10 TABLET ORAL at 11:58

## 2021-04-11 RX ADMIN — ONDANSETRON 4 MILLIGRAM(S): 8 TABLET, FILM COATED ORAL at 18:13

## 2021-04-11 RX ADMIN — OXYCODONE HYDROCHLORIDE 10 MILLIGRAM(S): 5 TABLET ORAL at 16:12

## 2021-04-11 RX ADMIN — METHOCARBAMOL 750 MILLIGRAM(S): 500 TABLET, FILM COATED ORAL at 14:06

## 2021-04-11 RX ADMIN — ENOXAPARIN SODIUM 40 MILLIGRAM(S): 100 INJECTION SUBCUTANEOUS at 05:51

## 2021-04-11 RX ADMIN — OXYCODONE HYDROCHLORIDE 10 MILLIGRAM(S): 5 TABLET ORAL at 05:50

## 2021-04-11 RX ADMIN — HYDROMORPHONE HYDROCHLORIDE 0.5 MILLIGRAM(S): 2 INJECTION INTRAMUSCULAR; INTRAVENOUS; SUBCUTANEOUS at 18:12

## 2021-04-11 RX ADMIN — HYDROMORPHONE HYDROCHLORIDE 0.5 MILLIGRAM(S): 2 INJECTION INTRAMUSCULAR; INTRAVENOUS; SUBCUTANEOUS at 18:27

## 2021-04-11 RX ADMIN — PANTOPRAZOLE SODIUM 40 MILLIGRAM(S): 20 TABLET, DELAYED RELEASE ORAL at 11:58

## 2021-04-11 RX ADMIN — OXYCODONE HYDROCHLORIDE 10 MILLIGRAM(S): 5 TABLET ORAL at 01:22

## 2021-04-11 RX ADMIN — OXYCODONE HYDROCHLORIDE 10 MILLIGRAM(S): 5 TABLET ORAL at 22:53

## 2021-04-11 NOTE — PROGRESS NOTE ADULT - SUBJECTIVE AND OBJECTIVE BOX
HPI:  39 y/o female with PMHx Asthma presents for spine surgery today.  Her history includes slip and fall 1/2020 with resulting low back pain and left leg pain managed conservatively.  At that time she had a large L5/S1 disc herniation, which has resolved.  In 9/2020 she started with right leg pain that has been worsening.  She reports right greater than left leg pain.  She denies bowel or bladder changes, saddle anesthesia.  She denies weakness, numbness. (08 Apr 2021 06:47)    INTERVAL EVENTS: Worked with PT, had difficulty with stairs. Dispo home pending improvement with stairs. Feels well overall, pain controlled, except with stairs.     Hospital Course:   4/8: POD0 L4-5 lami/discectomy  4/9: POD1. KEILA o/n, neuro stable. decadron 4q6  4/10: POD2. KEILA. Neuro stable. Continuing decadron 4q6; to be discharged on medrol dosepak.  4/11: POD 3. Worked with PT, had difficulty with stairs. Dispo home pending improvement with stairs.     Vital Signs Last 24 Hrs  T(C): 36.8 (10 Apr 2021 20:40), Max: 37 (10 Apr 2021 15:15)  T(F): 98.2 (10 Apr 2021 20:40), Max: 98.6 (10 Apr 2021 15:15)  HR: 52 (10 Apr 2021 20:40) (52 - 76)  BP: 103/69 (10 Apr 2021 20:40) (103/69 - 123/74)  BP(mean): 80 (10 Apr 2021 20:40) (80 - 80)  RR: 16 (10 Apr 2021 20:40) (16 - 17)  SpO2: 94% (10 Apr 2021 20:40) (94% - 98%)    I&O's Summary    10 Apr 2021 07:01  -  11 Apr 2021 01:06  --------------------------------------------------------  IN: 600 mL / OUT: 0 mL / NET: 600 mL        PHYSICAL EXAM:  GEN: laying in bed, appears well, NAD  NEURO: AOx3. FC, OE spont, speech intact, face symmetric. CNII-XII intact. PERRL, EOMI. No pronator drift. MAEx4. 5/5 strength throughout. Sensation intact to light touch throughout.  CV: RRR +S1/S2  PULM: CTAB  GI: Abd soft, NT/ND  EXT: ext warm, dry, nontender  WOUND: lumbar incision c/d/i      TUBES/LINES:  [] Klein  [] Lumbar Drain  [] Wound Drains  [] Others      DIET:  [] NPO  [x] Mechanical  [] Tube feeds    LABS:                        10.8   15.93 )-----------( 421      ( 10 Apr 2021 08:36 )             34.8     04-10    133<L>  |  96  |  14  ----------------------------<  109<H>  4.1   |  27  |  0.68    Ca    8.9      10 Apr 2021 08:36  Phos  3.2     04-10  Mg     2.1     04-10              CAPILLARY BLOOD GLUCOSE          Drug Levels: [] N/A    CSF Analysis: [] N/A      Allergies    meloxicam (Short breath)    Intolerances      MEDICATIONS:  Antibiotics:    Neuro:  HYDROmorphone  Injectable 0.5 milliGRAM(s) IV Push every 3 hours PRN  methocarbamol 750 milliGRAM(s) Oral every 8 hours  metoclopramide Injectable 10 milliGRAM(s) IV Push every 6 hours PRN  ondansetron   Disintegrating Tablet 4 milliGRAM(s) Oral every 6 hours  oxyCODONE    IR 5 milliGRAM(s) Oral every 4 hours PRN  oxyCODONE    IR 10 milliGRAM(s) Oral every 4 hours PRN  pregabalin 50 milliGRAM(s) Oral three times a day    Anticoagulation:  enoxaparin Injectable 40 milliGRAM(s) SubCutaneous two times a day    OTHER:  ALBUTerol    90 MICROgram(s) HFA Inhaler 2 Puff(s) Inhalation every 6 hours PRN  budesonide 160 MICROgram(s)/formoterol 4.5 MICROgram(s) Inhaler 2 Puff(s) Inhalation daily  BUpivacaine liposome 1.3% Injectable (no eMAR) 20 milliLiter(s) Local Injection Once  dexAMETHasone  Injectable 4 milliGRAM(s) IV Push every 6 hours  loratadine 10 milliGRAM(s) Oral daily  magnesium hydroxide Suspension 30 milliLiter(s) Oral daily PRN  pantoprazole    Tablet 40 milliGRAM(s) Oral daily  polyethylene glycol 3350 17 Gram(s) Oral daily  povidone iodine 5% Nasal Swab 1 Application(s) Both Nostrils once  senna 2 Tablet(s) Oral daily    IVF:  multivitamin 1 Tablet(s) Oral daily    CULTURES:    RADIOLOGY & ADDITIONAL TESTS:      ASSESSMENT:  41yo Female PMHx asthma, s/p  S/P L4-5 lumbar laminectomy with discectomy (4/8)    M51.26    Handoff    MEWS Score    Obesity    Asthma    Lumbar disc disorder with myelopathy    Radiculopathy affecting upper extremity    COVID-19    Anemia    Heavy menses    Sciatica    Lumbar disc herniation    Lumbar disc herniation    Lumbar laminectomy with discectomy by posterior approach    Lumbar herniated disc    Lumbar laminectomy with discectomy by posterior approach    No significant past surgical history    Mild persistent asthma    SysAdmin_VstLnk        PLAN:  NEURO:  - neuro checks q4h  - pain control, ERAS  - decadron x 3 days, dc on medrol dose pack   - no drains, no imaging  - PT/OT    CARDIOVASCULAR:  - normotensive SBP goal     PULMONARY:  - IS  - cont albuterol/symbicort    RENAL:  - repletions prn   - voiding    GI:  - regular diet  - bowel regimen   - PPI for GI ppx while on steroids    HEME:  - h/h stable  - SCDs, SQL 40mg BID    ID:  - afebrile  - leukocytosis likely reactive post op/secondary to steroids  - COVID Ab positive    ENDO:  - glucose goal 140-180    DVT PROPHYLAXIS:  [x] Venodynes                                [x] Heparin/Lovenox    DISPOSITION: full code, stable, dispo pending  for home with no needs    D/w Dr. Ayers      Assessment:  Present when checked    []  GCS  E   V  M     Heart Failure: []Acute, [] acute on chronic , []chronic  Heart Failure:  [] Diastolic (HFpEF), [] Systolic (HFrEF), []Combined (HFpEF and HFrEF), [] RHF, [] Pulm HTN, [] Other    [] EUNICE, [] ATN, [] AIN, [] other  [] CKD1, [] CKD2, [] CKD 3, [] CKD 4, [] CKD 5, []ESRD    Encephalopathy: [] Metabolic, [] Hepatic, [] toxic, [] Neurological, [] Other    Abnormal Nurtitional Status: [] malnurtition (see nutrition note), [ ]underweight: BMI < 19, [] morbid obesity: BMI >40, [] Cachexia    [] Sepsis  [] hypovolemic shock,[] cardiogenic shock, [] hemorrhagic shock, [] neuogenic shock  [] Acute Respiratory Failure  []Cerebral edema, [] Brain compression/ herniation,   [] Functional quadriplegia  [] Acute blood loss anemia

## 2021-04-12 LAB
ANION GAP SERPL CALC-SCNC: 10 MMOL/L — SIGNIFICANT CHANGE UP (ref 5–17)
BUN SERPL-MCNC: 18 MG/DL — SIGNIFICANT CHANGE UP (ref 7–23)
CALCIUM SERPL-MCNC: 8.5 MG/DL — SIGNIFICANT CHANGE UP (ref 8.4–10.5)
CHLORIDE SERPL-SCNC: 96 MMOL/L — SIGNIFICANT CHANGE UP (ref 96–108)
CO2 SERPL-SCNC: 26 MMOL/L — SIGNIFICANT CHANGE UP (ref 22–31)
CREAT SERPL-MCNC: 0.7 MG/DL — SIGNIFICANT CHANGE UP (ref 0.5–1.3)
GLUCOSE SERPL-MCNC: 86 MG/DL — SIGNIFICANT CHANGE UP (ref 70–99)
HCT VFR BLD CALC: 35.1 % — SIGNIFICANT CHANGE UP (ref 34.5–45)
HGB BLD-MCNC: 10.6 G/DL — LOW (ref 11.5–15.5)
MAGNESIUM SERPL-MCNC: 2.1 MG/DL — SIGNIFICANT CHANGE UP (ref 1.6–2.6)
MCHC RBC-ENTMCNC: 23.3 PG — LOW (ref 27–34)
MCHC RBC-ENTMCNC: 30.2 GM/DL — LOW (ref 32–36)
MCV RBC AUTO: 77.3 FL — LOW (ref 80–100)
NRBC # BLD: 0 /100 WBCS — SIGNIFICANT CHANGE UP (ref 0–0)
PHOSPHATE SERPL-MCNC: 3.4 MG/DL — SIGNIFICANT CHANGE UP (ref 2.5–4.5)
PLATELET # BLD AUTO: 413 K/UL — HIGH (ref 150–400)
POTASSIUM SERPL-MCNC: 3.9 MMOL/L — SIGNIFICANT CHANGE UP (ref 3.5–5.3)
POTASSIUM SERPL-SCNC: 3.9 MMOL/L — SIGNIFICANT CHANGE UP (ref 3.5–5.3)
RBC # BLD: 4.54 M/UL — SIGNIFICANT CHANGE UP (ref 3.8–5.2)
RBC # FLD: 18.1 % — HIGH (ref 10.3–14.5)
SODIUM SERPL-SCNC: 132 MMOL/L — LOW (ref 135–145)
WBC # BLD: 13.19 K/UL — HIGH (ref 3.8–10.5)
WBC # FLD AUTO: 13.19 K/UL — HIGH (ref 3.8–10.5)

## 2021-04-12 PROCEDURE — 99024 POSTOP FOLLOW-UP VISIT: CPT

## 2021-04-12 RX ORDER — ONDANSETRON 8 MG/1
4 TABLET, FILM COATED ORAL EVERY 6 HOURS
Refills: 0 | Status: DISCONTINUED | OUTPATIENT
Start: 2021-04-12 | End: 2021-04-14

## 2021-04-12 RX ORDER — NALOXEGOL OXALATE 12.5 MG/1
12.5 TABLET, FILM COATED ORAL DAILY
Refills: 0 | Status: DISCONTINUED | OUTPATIENT
Start: 2021-04-12 | End: 2021-04-13

## 2021-04-12 RX ORDER — POTASSIUM CHLORIDE 20 MEQ
10 PACKET (EA) ORAL ONCE
Refills: 0 | Status: COMPLETED | OUTPATIENT
Start: 2021-04-12 | End: 2021-04-12

## 2021-04-12 RX ORDER — MAGNESIUM HYDROXIDE 400 MG/1
30 TABLET, CHEWABLE ORAL DAILY
Refills: 0 | Status: DISCONTINUED | OUTPATIENT
Start: 2021-04-12 | End: 2021-04-14

## 2021-04-12 RX ORDER — SODIUM CHLORIDE 9 MG/ML
2 INJECTION INTRAMUSCULAR; INTRAVENOUS; SUBCUTANEOUS EVERY 6 HOURS
Refills: 0 | Status: DISCONTINUED | OUTPATIENT
Start: 2021-04-12 | End: 2021-04-14

## 2021-04-12 RX ADMIN — OXYCODONE HYDROCHLORIDE 10 MILLIGRAM(S): 5 TABLET ORAL at 03:51

## 2021-04-12 RX ADMIN — Medication 50 MILLIGRAM(S): at 22:49

## 2021-04-12 RX ADMIN — OXYCODONE HYDROCHLORIDE 10 MILLIGRAM(S): 5 TABLET ORAL at 02:51

## 2021-04-12 RX ADMIN — Medication 24 MILLIGRAM(S): at 08:16

## 2021-04-12 RX ADMIN — MAGNESIUM HYDROXIDE 30 MILLILITER(S): 400 TABLET, CHEWABLE ORAL at 11:35

## 2021-04-12 RX ADMIN — METHOCARBAMOL 750 MILLIGRAM(S): 500 TABLET, FILM COATED ORAL at 13:35

## 2021-04-12 RX ADMIN — OXYCODONE HYDROCHLORIDE 10 MILLIGRAM(S): 5 TABLET ORAL at 09:02

## 2021-04-12 RX ADMIN — OXYCODONE HYDROCHLORIDE 10 MILLIGRAM(S): 5 TABLET ORAL at 13:35

## 2021-04-12 RX ADMIN — ONDANSETRON 4 MILLIGRAM(S): 8 TABLET, FILM COATED ORAL at 23:15

## 2021-04-12 RX ADMIN — SODIUM CHLORIDE 2 GRAM(S): 9 INJECTION INTRAMUSCULAR; INTRAVENOUS; SUBCUTANEOUS at 17:53

## 2021-04-12 RX ADMIN — Medication 50 MILLIGRAM(S): at 06:18

## 2021-04-12 RX ADMIN — OXYCODONE HYDROCHLORIDE 10 MILLIGRAM(S): 5 TABLET ORAL at 21:44

## 2021-04-12 RX ADMIN — ENOXAPARIN SODIUM 40 MILLIGRAM(S): 100 INJECTION SUBCUTANEOUS at 17:43

## 2021-04-12 RX ADMIN — METHOCARBAMOL 750 MILLIGRAM(S): 500 TABLET, FILM COATED ORAL at 22:49

## 2021-04-12 RX ADMIN — SODIUM CHLORIDE 2 GRAM(S): 9 INJECTION INTRAMUSCULAR; INTRAVENOUS; SUBCUTANEOUS at 11:37

## 2021-04-12 RX ADMIN — LORATADINE 10 MILLIGRAM(S): 10 TABLET ORAL at 11:34

## 2021-04-12 RX ADMIN — HYDROMORPHONE HYDROCHLORIDE 0.5 MILLIGRAM(S): 2 INJECTION INTRAMUSCULAR; INTRAVENOUS; SUBCUTANEOUS at 04:45

## 2021-04-12 RX ADMIN — OXYCODONE HYDROCHLORIDE 10 MILLIGRAM(S): 5 TABLET ORAL at 17:43

## 2021-04-12 RX ADMIN — MAGNESIUM HYDROXIDE 30 MILLILITER(S): 400 TABLET, CHEWABLE ORAL at 06:18

## 2021-04-12 RX ADMIN — ENOXAPARIN SODIUM 40 MILLIGRAM(S): 100 INJECTION SUBCUTANEOUS at 06:18

## 2021-04-12 RX ADMIN — BUDESONIDE AND FORMOTEROL FUMARATE DIHYDRATE 2 PUFF(S): 160; 4.5 AEROSOL RESPIRATORY (INHALATION) at 12:58

## 2021-04-12 RX ADMIN — Medication 50 MILLIGRAM(S): at 13:35

## 2021-04-12 RX ADMIN — OXYCODONE HYDROCHLORIDE 10 MILLIGRAM(S): 5 TABLET ORAL at 14:08

## 2021-04-12 RX ADMIN — SODIUM CHLORIDE 2 GRAM(S): 9 INJECTION INTRAMUSCULAR; INTRAVENOUS; SUBCUTANEOUS at 23:15

## 2021-04-12 RX ADMIN — Medication 1 TABLET(S): at 11:36

## 2021-04-12 RX ADMIN — OXYCODONE HYDROCHLORIDE 10 MILLIGRAM(S): 5 TABLET ORAL at 09:38

## 2021-04-12 RX ADMIN — Medication 10 MILLIEQUIVALENT(S): at 08:16

## 2021-04-12 RX ADMIN — ONDANSETRON 4 MILLIGRAM(S): 8 TABLET, FILM COATED ORAL at 06:18

## 2021-04-12 RX ADMIN — SENNA PLUS 2 TABLET(S): 8.6 TABLET ORAL at 11:36

## 2021-04-12 RX ADMIN — ONDANSETRON 4 MILLIGRAM(S): 8 TABLET, FILM COATED ORAL at 17:43

## 2021-04-12 RX ADMIN — ONDANSETRON 4 MILLIGRAM(S): 8 TABLET, FILM COATED ORAL at 11:36

## 2021-04-12 RX ADMIN — POLYETHYLENE GLYCOL 3350 17 GRAM(S): 17 POWDER, FOR SOLUTION ORAL at 11:36

## 2021-04-12 RX ADMIN — OXYCODONE HYDROCHLORIDE 10 MILLIGRAM(S): 5 TABLET ORAL at 18:15

## 2021-04-12 RX ADMIN — NALOXEGOL OXALATE 12.5 MILLIGRAM(S): 12.5 TABLET, FILM COATED ORAL at 17:43

## 2021-04-12 RX ADMIN — PANTOPRAZOLE SODIUM 40 MILLIGRAM(S): 20 TABLET, DELAYED RELEASE ORAL at 11:36

## 2021-04-12 RX ADMIN — OXYCODONE HYDROCHLORIDE 10 MILLIGRAM(S): 5 TABLET ORAL at 22:44

## 2021-04-12 RX ADMIN — HYDROMORPHONE HYDROCHLORIDE 0.5 MILLIGRAM(S): 2 INJECTION INTRAMUSCULAR; INTRAVENOUS; SUBCUTANEOUS at 04:30

## 2021-04-12 RX ADMIN — METHOCARBAMOL 750 MILLIGRAM(S): 500 TABLET, FILM COATED ORAL at 06:18

## 2021-04-12 NOTE — DIETITIAN INITIAL EVALUATION ADULT. - PERTINENT MEDS FT
Medrol, mg hydroxide, 2gm q6h, fleet mineral oil enema, reglan, miralax, dilaudid, MVI, protonix , senna, zofran ODT, oxycodone

## 2021-04-12 NOTE — PROGRESS NOTE ADULT - SUBJECTIVE AND OBJECTIVE BOX
40F asthma (x2 admissions in past) with h/o fall (01/2020) resulting left radicular LBP initially managed conservatively. Pt had a large L5/S1 disc herniation, which had resolved. In 09/2020 she started with right leg pain that had been worsening. Reports R>L leg pain. Denies bowel or bladder changes, saddle anesthesia. Denies weakness, numbness. Pt is now s/p an L4/5 laminectomy/discectomy (4/8/21).    4/8 POD0: L4-5 lami/discectomy  4/9 POD1: No acute o/n events. Reports incrs'd particularly this AM, sharp in low back. Denies radicular discomfort.  4/12 POD4: No acute overnight events. Pt doing well, however admits to some nausea and denies any bowel movements.    Vital Signs Last 24 Hrs  T(C): 36.9 (12 Apr 2021 08:21), Max: 37 (11 Apr 2021 15:14)  T(F): 98.4 (12 Apr 2021 08:21), Max: 98.6 (11 Apr 2021 15:14)  HR: 61 (12 Apr 2021 08:21) (55 - 61)  BP: 105/68 (12 Apr 2021 08:21) (105/68 - 128/81)  BP(mean): --  RR: 14 (12 Apr 2021 08:21) (14 - 16)  SpO2: 99% (12 Apr 2021 08:21) (97% - 100%)    PHYSICAL EXAM:  GEN: laying in bed, appears well, NAD  NEURO: AOx3. FC, OE spont, speech intact, face symmetric. CNII-XII intact. PERRL, EOMI. No pronator drift. MAEx4. 5/5 strength throughout. SILT  CV: RRR +S1/S2  PULM: CTAB  GI: Abd soft, NT/ND  EXT: ext warm, dry, nontender  WOUND: lumbar incision c/d/i    LABS:  cret                        10.6   13.19 )-----------( 413      ( 12 Apr 2021 06:24 )             35.1     04-12    132<L>  |  96  |  18  ----------------------------<  86  3.9   |  26  |  0.70    Ca    8.5      12 Apr 2021 06:24  Phos  3.4     04-12  Mg     2.1     04-12    Allergies: meloxicam (shortness of breath, tachycardia)    MEDICATIONS  (STANDING):  budesonide 160 MICROgram(s)/formoterol 4.5 MICROgram(s) Inhaler 2 Puff(s) Inhalation daily  BUpivacaine liposome 1.3% Injectable (no eMAR) 20 milliLiter(s) Local Injection Once  enoxaparin Injectable 40 milliGRAM(s) SubCutaneous two times a day  loratadine 10 milliGRAM(s) Oral daily  magnesium hydroxide Suspension 30 milliLiter(s) Oral daily  methocarbamol 750 milliGRAM(s) Oral every 8 hours  methylPREDNISolone   Oral   mineral oil enema 133 milliLiter(s) Rectal once  multivitamin 1 Tablet(s) Oral daily  ondansetron   Disintegrating Tablet 4 milliGRAM(s) Oral every 6 hours  pantoprazole    Tablet 40 milliGRAM(s) Oral daily  polyethylene glycol 3350 17 Gram(s) Oral daily  povidone iodine 5% Nasal Swab 1 Application(s) Both Nostrils once  pregabalin 50 milliGRAM(s) Oral three times a day  senna 2 Tablet(s) Oral daily  sodium chloride 2 Gram(s) Oral every 6 hours    MEDICATIONS  (PRN):  ALBUTerol    90 MICROgram(s) HFA Inhaler 2 Puff(s) Inhalation every 6 hours PRN Shortness of Breath and/or Wheezing  HYDROmorphone  Injectable 0.5 milliGRAM(s) IV Push every 3 hours PRN breakthrough pain  metoclopramide Injectable 10 milliGRAM(s) IV Push every 6 hours PRN nausea an vomitting  ondansetron Injectable 4 milliGRAM(s) IV Push every 6 hours PRN Nausea and/or Vomiting  oxyCODONE    IR 5 milliGRAM(s) Oral every 4 hours PRN Moderate Pain (4 - 6)  oxyCODONE    IR 10 milliGRAM(s) Oral every 4 hours PRN Severe Pain (7 - 10)    40F asthma (x2 admissions in past) now s/p an L4/5 laminectomy/discectomy (4/8/21). Progressing appropriately postop.    - cont oxycodone 5-10mg q4h prn moderate-severe pain  - cont dilaudid 0.5mg IV q3h prn breakthrough pain  - cont pregabalin 50mg tid  - cont methocarbamol to 750mg po q8h  - cons adding Movantik to bowel regimen  - pain service will cont to follow

## 2021-04-12 NOTE — DIETITIAN INITIAL EVALUATION ADULT. - PERSON TAUGHT/METHOD
encouraged increased PO intake with emphasis on lean protein for healing post-op as tolerated- pt appeared receptive/verbal instruction/patient instructed

## 2021-04-12 NOTE — PROGRESS NOTE ADULT - SUBJECTIVE AND OBJECTIVE BOX
HPI:  39 y/o female with PMHx Asthma presents for spine surgery today.  Her history includes slip and fall 1/2020 with resulting low back pain and left leg pain managed conservatively.  At that time she had a large L5/S1 disc herniation, which has resolved.  In 9/2020 she started with right leg pain that has been worsening.  She reports right greater than left leg pain.  She denies bowel or bladder changes, saddle anesthesia.  She denies weakness, numbness. (08 Apr 2021 06:47)    INTERVAL EVENTS:  Feels well overall, pain controlled, except with stairs (6 flights of stairs at home). Reports subjective R hip "swelling" that she is attributing to position in bed. Dispo home pending improvement with stairs.    Hospital Course:   4/8: POD0 L4-5 lami/discectomy  4/9: POD1. KEILA o/n, neuro stable. decadron 4q6  4/10: POD2. KEILA. Neuro stable. Continuing decadron 4q6; to be discharged on medrol dosepak.  4/11: POD 3. Worked with PT, had difficulty with stairs. Dispo home pending improvement with stairs.   4/12: POD 4. Pain controlled at rest, but severe pain with stairs. f/u pain management recs. for home with Home PT pending improvement with stairs.      Vital Signs Last 24 Hrs  T(C): 36.9 (11 Apr 2021 20:06), Max: 37 (11 Apr 2021 15:14)  T(F): 98.4 (11 Apr 2021 20:06), Max: 98.6 (11 Apr 2021 15:14)  HR: 56 (11 Apr 2021 20:06) (51 - 56)  BP: 120/83 (11 Apr 2021 20:06) (118/75 - 128/81)  BP(mean): 89 (11 Apr 2021 05:46) (89 - 89)  RR: 16 (11 Apr 2021 20:06) (16 - 18)  SpO2: 98% (11 Apr 2021 20:06) (97% - 100%)    I&O's Summary    10 Apr 2021 07:01  -  11 Apr 2021 07:00  --------------------------------------------------------  IN: 1400 mL / OUT: 0 mL / NET: 1400 mL    11 Apr 2021 07:01  -  12 Apr 2021 00:50  --------------------------------------------------------  IN: 720 mL / OUT: 1100 mL / NET: -380 mL        PHYSICAL EXAM:  GEN: laying in bed, appears well, NAD  NEURO: AOx3. FC, OE spont, speech intact, face symmetric. CNII-XII intact. PERRL, EOMI. No pronator drift. MAEx4. 5/5 strength throughout. RLE limited by pain. Sensation intact to light touch throughout.  CV: RRR +S1/S2  PULM: CTAB  GI: Abd soft, NT/ND  EXT: ext warm, dry, nontender  WOUND: lumbar incision c/d/i      TUBES/LINES:  [] Klein  [] Lumbar Drain  [] Wound Drains  [] Others      DIET:  [] NPO  [x] Mechanical  [] Tube feeds    LABS:                        10.8   13.14 )-----------( 422      ( 11 Apr 2021 08:52 )             35.6     04-11    134<L>  |  98  |  14  ----------------------------<  104<H>  4.2   |  28  |  0.69    Ca    8.6      11 Apr 2021 08:52  Phos  3.2     04-11  Mg     2.2     04-11      Allergies  meloxicam (Short breath)    Intolerances      MEDICATIONS:  Antibiotics:    Neuro:  HYDROmorphone  Injectable 0.5 milliGRAM(s) IV Push every 3 hours PRN  methocarbamol 750 milliGRAM(s) Oral every 8 hours  metoclopramide Injectable 10 milliGRAM(s) IV Push every 6 hours PRN  ondansetron   Disintegrating Tablet 4 milliGRAM(s) Oral every 6 hours  oxyCODONE    IR 5 milliGRAM(s) Oral every 4 hours PRN  oxyCODONE    IR 10 milliGRAM(s) Oral every 4 hours PRN  pregabalin 50 milliGRAM(s) Oral three times a day    Anticoagulation:  enoxaparin Injectable 40 milliGRAM(s) SubCutaneous two times a day    OTHER:  ALBUTerol    90 MICROgram(s) HFA Inhaler 2 Puff(s) Inhalation every 6 hours PRN  budesonide 160 MICROgram(s)/formoterol 4.5 MICROgram(s) Inhaler 2 Puff(s) Inhalation daily  BUpivacaine liposome 1.3% Injectable (no eMAR) 20 milliLiter(s) Local Injection Once  loratadine 10 milliGRAM(s) Oral daily  magnesium hydroxide Suspension 30 milliLiter(s) Oral daily PRN  mineral oil enema 133 milliLiter(s) Rectal once  pantoprazole    Tablet 40 milliGRAM(s) Oral daily  polyethylene glycol 3350 17 Gram(s) Oral daily  povidone iodine 5% Nasal Swab 1 Application(s) Both Nostrils once  senna 2 Tablet(s) Oral daily    IVF:  multivitamin 1 Tablet(s) Oral daily    CULTURES:    RADIOLOGY & ADDITIONAL TESTS:      ASSESSMENT:  39yo Female PMHx asthma, s/p  S/P L4-5 lumbar laminectomy with discectomy (4/8)      PLAN:  NEURO:  - neuro checks q4h  - pain control, ERAS  - decadron x 3 days, dc on medrol dose pack   - no drains, no imaging  - PT/OT    CARDIOVASCULAR:  - normotensive SBP goal     PULMONARY:  - IS  - cont albuterol/symbicort    RENAL:  - repletions prn   - voiding    GI:  - regular diet  - bowel regimen   - PPI for GI ppx while on steroids    HEME:  - h/h stable  - SCDs, SQL 40mg BID    ID:  - afebrile  - leukocytosis likely reactive post op/secondary to steroids  - COVID Ab positive    ENDO:  - glucose goal 140-180    DVT PROPHYLAXIS:  [x] Venodynes                                [x] Heparin/Lovenox    DISPOSITION: full code, stable, dispo pending  for home with home PT    D/w Dr. Ayers      Assessment:  Present when checked    []  GCS  E   V  M     Heart Failure: []Acute, [] acute on chronic , []chronic  Heart Failure:  [] Diastolic (HFpEF), [] Systolic (HFrEF), []Combined (HFpEF and HFrEF), [] RHF, [] Pulm HTN, [] Other    [] EUNICE, [] ATN, [] AIN, [] other  [] CKD1, [] CKD2, [] CKD 3, [] CKD 4, [] CKD 5, []ESRD    Encephalopathy: [] Metabolic, [] Hepatic, [] toxic, [] Neurological, [] Other    Abnormal Nurtitional Status: [] malnurtition (see nutrition note), [ ]underweight: BMI < 19, [] morbid obesity: BMI >40, [] Cachexia    [] Sepsis  [] hypovolemic shock,[] cardiogenic shock, [] hemorrhagic shock, [] neuogenic shock  [] Acute Respiratory Failure  []Cerebral edema, [] Brain compression/ herniation,   [] Functional quadriplegia  [] Acute blood loss anemia

## 2021-04-12 NOTE — DIETITIAN INITIAL EVALUATION ADULT. - OTHER INFO
41yo Female PMHx asthma, s/p  S/P L4-5 lumbar laminectomy with discectomy (4/8). D/C pending PT clearance.    Pt seen resting in bed, endorses pain and nausea but denies vomiting (ordered for zofran and reglan). No BM since 4/7 (ordered for fleet enema today, also ordered for mg hydroxide, miralax, and senna; states she is typically constipated as she takes iron pills PTA). No flatus since surgery but endorses burping, states she feels bloated. Jason score 19, no edema noted. Pt ordered for regular diet, endorses decreased appetite but trying her best to consumes ~50% of her meals. Pt endorses good PO intake PTA at baseline, NKFA, pt follows a regular diet. UBW is 224lb, denies weight changes PTA. Please see below for full nutritional recommendations- d/w team. RD to monitor and f/u per protocol.

## 2021-04-12 NOTE — DIETITIAN INITIAL EVALUATION ADULT. - OTHER CALCULATIONS
IBW used to calculate energy needs due to pt's current body weight exceeding 120% of IBW (179%). Needs adjusted for BMI, post-op. Aim for higher end of kcal/protein ranges. Fluid needs per team 2/2 hyponatremia.

## 2021-04-12 NOTE — DIETITIAN INITIAL EVALUATION ADULT. - ADD RECOMMEND
1. Recommend continue with fleet enema today; further bowel regimen per team 2. Monitor need for ONS 3. Anti-emetic regimen per team

## 2021-04-13 LAB
ANION GAP SERPL CALC-SCNC: 8 MMOL/L — SIGNIFICANT CHANGE UP (ref 5–17)
BUN SERPL-MCNC: 19 MG/DL — SIGNIFICANT CHANGE UP (ref 7–23)
CALCIUM SERPL-MCNC: 8.4 MG/DL — SIGNIFICANT CHANGE UP (ref 8.4–10.5)
CHLORIDE SERPL-SCNC: 99 MMOL/L — SIGNIFICANT CHANGE UP (ref 96–108)
CO2 SERPL-SCNC: 28 MMOL/L — SIGNIFICANT CHANGE UP (ref 22–31)
CREAT SERPL-MCNC: 0.77 MG/DL — SIGNIFICANT CHANGE UP (ref 0.5–1.3)
GLUCOSE SERPL-MCNC: 115 MG/DL — HIGH (ref 70–99)
HCT VFR BLD CALC: 36.4 % — SIGNIFICANT CHANGE UP (ref 34.5–45)
HGB BLD-MCNC: 10.9 G/DL — LOW (ref 11.5–15.5)
MAGNESIUM SERPL-MCNC: 2 MG/DL — SIGNIFICANT CHANGE UP (ref 1.6–2.6)
MCHC RBC-ENTMCNC: 23.5 PG — LOW (ref 27–34)
MCHC RBC-ENTMCNC: 29.9 GM/DL — LOW (ref 32–36)
MCV RBC AUTO: 78.4 FL — LOW (ref 80–100)
NRBC # BLD: 0 /100 WBCS — SIGNIFICANT CHANGE UP (ref 0–0)
PHOSPHATE SERPL-MCNC: 3 MG/DL — SIGNIFICANT CHANGE UP (ref 2.5–4.5)
PLATELET # BLD AUTO: 391 K/UL — SIGNIFICANT CHANGE UP (ref 150–400)
POTASSIUM SERPL-MCNC: 3.9 MMOL/L — SIGNIFICANT CHANGE UP (ref 3.5–5.3)
POTASSIUM SERPL-SCNC: 3.9 MMOL/L — SIGNIFICANT CHANGE UP (ref 3.5–5.3)
RBC # BLD: 4.64 M/UL — SIGNIFICANT CHANGE UP (ref 3.8–5.2)
RBC # FLD: 18.1 % — HIGH (ref 10.3–14.5)
SODIUM SERPL-SCNC: 135 MMOL/L — SIGNIFICANT CHANGE UP (ref 135–145)
WBC # BLD: 16.38 K/UL — HIGH (ref 3.8–10.5)
WBC # FLD AUTO: 16.38 K/UL — HIGH (ref 3.8–10.5)

## 2021-04-13 PROCEDURE — 99024 POSTOP FOLLOW-UP VISIT: CPT

## 2021-04-13 RX ADMIN — Medication 4 MILLIGRAM(S): at 18:00

## 2021-04-13 RX ADMIN — METHOCARBAMOL 750 MILLIGRAM(S): 500 TABLET, FILM COATED ORAL at 22:27

## 2021-04-13 RX ADMIN — ENOXAPARIN SODIUM 40 MILLIGRAM(S): 100 INJECTION SUBCUTANEOUS at 06:58

## 2021-04-13 RX ADMIN — SODIUM CHLORIDE 2 GRAM(S): 9 INJECTION INTRAMUSCULAR; INTRAVENOUS; SUBCUTANEOUS at 18:00

## 2021-04-13 RX ADMIN — SODIUM CHLORIDE 2 GRAM(S): 9 INJECTION INTRAMUSCULAR; INTRAVENOUS; SUBCUTANEOUS at 11:43

## 2021-04-13 RX ADMIN — Medication 4 MILLIGRAM(S): at 13:29

## 2021-04-13 RX ADMIN — Medication 50 MILLIGRAM(S): at 13:28

## 2021-04-13 RX ADMIN — OXYCODONE HYDROCHLORIDE 10 MILLIGRAM(S): 5 TABLET ORAL at 14:06

## 2021-04-13 RX ADMIN — Medication 50 MILLIGRAM(S): at 06:58

## 2021-04-13 RX ADMIN — LORATADINE 10 MILLIGRAM(S): 10 TABLET ORAL at 11:42

## 2021-04-13 RX ADMIN — OXYCODONE HYDROCHLORIDE 10 MILLIGRAM(S): 5 TABLET ORAL at 02:20

## 2021-04-13 RX ADMIN — METHOCARBAMOL 750 MILLIGRAM(S): 500 TABLET, FILM COATED ORAL at 06:58

## 2021-04-13 RX ADMIN — OXYCODONE HYDROCHLORIDE 10 MILLIGRAM(S): 5 TABLET ORAL at 23:26

## 2021-04-13 RX ADMIN — SODIUM CHLORIDE 2 GRAM(S): 9 INJECTION INTRAMUSCULAR; INTRAVENOUS; SUBCUTANEOUS at 06:57

## 2021-04-13 RX ADMIN — SODIUM CHLORIDE 2 GRAM(S): 9 INJECTION INTRAMUSCULAR; INTRAVENOUS; SUBCUTANEOUS at 23:53

## 2021-04-13 RX ADMIN — OXYCODONE HYDROCHLORIDE 10 MILLIGRAM(S): 5 TABLET ORAL at 08:15

## 2021-04-13 RX ADMIN — METHOCARBAMOL 750 MILLIGRAM(S): 500 TABLET, FILM COATED ORAL at 13:30

## 2021-04-13 RX ADMIN — Medication 1 TABLET(S): at 11:42

## 2021-04-13 RX ADMIN — OXYCODONE HYDROCHLORIDE 10 MILLIGRAM(S): 5 TABLET ORAL at 18:37

## 2021-04-13 RX ADMIN — OXYCODONE HYDROCHLORIDE 10 MILLIGRAM(S): 5 TABLET ORAL at 13:30

## 2021-04-13 RX ADMIN — ONDANSETRON 4 MILLIGRAM(S): 8 TABLET, FILM COATED ORAL at 06:57

## 2021-04-13 RX ADMIN — OXYCODONE HYDROCHLORIDE 10 MILLIGRAM(S): 5 TABLET ORAL at 07:15

## 2021-04-13 RX ADMIN — ONDANSETRON 4 MILLIGRAM(S): 8 TABLET, FILM COATED ORAL at 18:00

## 2021-04-13 RX ADMIN — ONDANSETRON 4 MILLIGRAM(S): 8 TABLET, FILM COATED ORAL at 23:53

## 2021-04-13 RX ADMIN — ONDANSETRON 4 MILLIGRAM(S): 8 TABLET, FILM COATED ORAL at 11:42

## 2021-04-13 RX ADMIN — OXYCODONE HYDROCHLORIDE 10 MILLIGRAM(S): 5 TABLET ORAL at 18:00

## 2021-04-13 RX ADMIN — Medication 50 MILLIGRAM(S): at 22:26

## 2021-04-13 RX ADMIN — OXYCODONE HYDROCHLORIDE 10 MILLIGRAM(S): 5 TABLET ORAL at 03:20

## 2021-04-13 RX ADMIN — PANTOPRAZOLE SODIUM 40 MILLIGRAM(S): 20 TABLET, DELAYED RELEASE ORAL at 11:42

## 2021-04-13 RX ADMIN — Medication 4 MILLIGRAM(S): at 06:57

## 2021-04-13 RX ADMIN — OXYCODONE HYDROCHLORIDE 10 MILLIGRAM(S): 5 TABLET ORAL at 22:26

## 2021-04-13 RX ADMIN — ENOXAPARIN SODIUM 40 MILLIGRAM(S): 100 INJECTION SUBCUTANEOUS at 18:00

## 2021-04-13 RX ADMIN — Medication 8 MILLIGRAM(S): at 22:27

## 2021-04-13 NOTE — PROGRESS NOTE ADULT - SUBJECTIVE AND OBJECTIVE BOX
HPI:  39 y/o female with PMHx Asthma presents for spine surgery today.  Her history includes slip and fall 1/2020 with resulting low back pain and left leg pain managed conservatively.  At that time she had a large L5/S1 disc herniation, which has resolved.  In 9/2020 she started with right leg pain that has been worsening.  She reports right greater than left leg pain.  She denies bowel or bladder changes, saddle anesthesia.  She denies weakness, numbness. (08 Apr 2021 06:47)    INTERVAL EVENTS:  Feels well overall, pain controlled, except with stairs (6 flights of stairs at home). Reports subjective R hip "swelling" that she is attributing to position in bed. Dispo home pending improvement with stairs.    Hospital Course:   4/8: POD0 L4-5 lami/discectomy  4/9: POD1. KEILA o/n, neuro stable. decadron 4q6  4/10: POD2. KEILA. Neuro stable. Continuing decadron 4q6; to be discharged on medrol dosepak.  4/11: POD 3. Worked with PT, had difficulty with stairs. Dispo home pending improvement with stairs.   4/12: POD 4. Pain controlled at rest, but severe pain with stairs. f/u pain management recs. for home with Home PT pending improvement with stairs.  4/13 POD 5. Pain well managed, pt still have persistent R leg pain at the hip. Continues to work with PT for stairs.      Vital Signs Last 24 Hrs  T(C): 36.8 (12 Apr 2021 20:11), Max: 37.3 (12 Apr 2021 15:12)  T(F): 98.3 (12 Apr 2021 20:11), Max: 99.2 (12 Apr 2021 15:12)  HR: 72 (12 Apr 2021 21:40) (58 - 72)  BP: 117/77 (12 Apr 2021 21:40) (104/70 - 118/79)  BP(mean): --  RR: 16 (12 Apr 2021 20:11) (14 - 16)  SpO2: 99% (12 Apr 2021 20:11) (97% - 99%)    I&O's Detail    11 Apr 2021 07:01  -  12 Apr 2021 07:00  --------------------------------------------------------  IN:    Oral Fluid: 720 mL  Total IN: 720 mL    OUT:    Voided (mL): 1100 mL  Total OUT: 1100 mL    Total NET: -380 mL      12 Apr 2021 07:01  -  13 Apr 2021 00:29  --------------------------------------------------------  IN:    Oral Fluid: 240 mL  Total IN: 240 mL    OUT:    Voided (mL): 450 mL  Total OUT: 450 mL    Total NET: -210 mL        I&O's Summary    11 Apr 2021 07:01  -  12 Apr 2021 07:00  --------------------------------------------------------  IN: 720 mL / OUT: 1100 mL / NET: -380 mL    12 Apr 2021 07:01  -  13 Apr 2021 00:29  --------------------------------------------------------  IN: 240 mL / OUT: 450 mL / NET: -210 mL      PHYSICAL EXAM:  GEN: laying in bed, appears well, NAD  NEURO: AOx3. FC, OE spont, speech intact, face symmetric. CNII-XII intact. PERRL, EOMI. No pronator drift. MAEx4. 5/5 strength throughout. RLE limited by pain. Sensation intact to light touch throughout.  CV: RRR +S1/S2  PULM: CTAB  GI: Abd soft, NT/ND  EXT: ext warm, dry, nontender  WOUND: lumbar incision c/d/i      TUBES/LINES:  [] CVC  [] A-line  [] Lumbar Drain  [] Ventriculostomy  [] Other    DIET:  [] NPO  [] Mechanical  [] Tube feeds    LABS:                        10.6   13.19 )-----------( 413      ( 12 Apr 2021 06:24 )             35.1     04-12    132<L>  |  96  |  18  ----------------------------<  86  3.9   |  26  |  0.70    Ca    8.5      12 Apr 2021 06:24  Phos  3.4     04-12  Mg     2.1     04-12              CAPILLARY BLOOD GLUCOSE          Drug Levels: [] N/A    CSF Analysis: [] N/A      Allergies    meloxicam (Short breath)    Intolerances      MEDICATIONS:  Antibiotics:    Neuro:  HYDROmorphone  Injectable 0.5 milliGRAM(s) IV Push every 3 hours PRN  methocarbamol 750 milliGRAM(s) Oral every 8 hours  metoclopramide Injectable 10 milliGRAM(s) IV Push every 6 hours PRN  ondansetron   Disintegrating Tablet 4 milliGRAM(s) Oral every 6 hours  ondansetron Injectable 4 milliGRAM(s) IV Push every 6 hours PRN  oxyCODONE    IR 5 milliGRAM(s) Oral every 4 hours PRN  oxyCODONE    IR 10 milliGRAM(s) Oral every 4 hours PRN  pregabalin 50 milliGRAM(s) Oral three times a day    Anticoagulation:  enoxaparin Injectable 40 milliGRAM(s) SubCutaneous two times a day    OTHER:  ALBUTerol    90 MICROgram(s) HFA Inhaler 2 Puff(s) Inhalation every 6 hours PRN  budesonide 160 MICROgram(s)/formoterol 4.5 MICROgram(s) Inhaler 2 Puff(s) Inhalation daily  BUpivacaine liposome 1.3% Injectable (no eMAR) 20 milliLiter(s) Local Injection Once  loratadine 10 milliGRAM(s) Oral daily  magnesium hydroxide Suspension 30 milliLiter(s) Oral daily  methylPREDNISolone 4 milliGRAM(s) Oral before breakfast  methylPREDNISolone 4 milliGRAM(s) Oral after lunch  methylPREDNISolone 4 milliGRAM(s) Oral after dinner  methylPREDNISolone 8 milliGRAM(s) Oral at bedtime  methylPREDNISolone   Oral   mineral oil enema 133 milliLiter(s) Rectal once  naloxegol 12.5 milliGRAM(s) Oral daily  pantoprazole    Tablet 40 milliGRAM(s) Oral daily  polyethylene glycol 3350 17 Gram(s) Oral daily  povidone iodine 5% Nasal Swab 1 Application(s) Both Nostrils once  senna 2 Tablet(s) Oral daily    IVF:  multivitamin 1 Tablet(s) Oral daily  sodium chloride 2 Gram(s) Oral every 6 hours    CULTURES:    RADIOLOGY & ADDITIONAL TESTS:      ASSESSMENT:  41yo Female PMHx asthma, s/p  S/P L4-5 lumbar laminectomy with discectomy (4/8)    M51.26    Handoff    MEWS Score    Obesity    Asthma    Lumbar disc disorder with myelopathy    Radiculopathy affecting upper extremity    COVID-19    Anemia    Heavy menses    Sciatica    Lumbar disc herniation    Lumbar disc herniation    Lumbar laminectomy with discectomy by posterior approach    Lumbar herniated disc    Lumbar laminectomy with discectomy by posterior approach    No significant past surgical history    Mild persistent asthma    [id number]        PLAN:  NEURO:  - neuro checks q4h  - pain control, ERAS  - dc on medrol dose pack   - no drains, no imaging  - PT/OT    CARDIOVASCULAR:  - normotensive SBP goal     PULMONARY:  - IS  - cont albuterol/symbicort    RENAL:  - repletions prn   - voiding    GI:  - regular diet  - bowel regimen   - PPI for GI ppx while on steroids    HEME:  - h/h stable  - SCDs, SQL 40mg BID    ID:  - afebrile  - leukocytosis likely reactive post op/secondary to steroids  - COVID Ab positive    ENDO:  - glucose goal 140-180    DVT PROPHYLAXIS:  [x] Venodynes                                [x] Heparin/Lovenox    DISPOSITION: full code, stable, dispo pending  for home with home PT    D/w Dr. Ayers          Assessment:  Present when checked    []  GCS  E   V  M     Heart Failure: []Acute, [] acute on chronic , []chronic  Heart Failure:  [] Diastolic (HFpEF), [] Systolic (HFrEF), []Combined (HFpEF and HFrEF), [] RHF, [] Pulm HTN, [] Other    [] EUNICE, [] ATN, [] AIN, [] other  [] CKD1, [] CKD2, [] CKD 3, [] CKD 4, [] CKD 5, []ESRD    Encephalopathy: [] Metabolic, [] Hepatic, [] toxic, [] Neurological, [] Other    Abnormal Nurtitional Status: [] malnurtition (see nutrition note), [ ]underweight: BMI < 19, [] morbid obesity: BMI >40, [] Cachexia    [] Sepsis  [] hypovolemic shock,[] cardiogenic shock, [] hemorrhagic shock, [] neuogenic shock  [] Acute Respiratory Failure  []Cerebral edema, [] Brain compression/ herniation,   [] Functional quadriplegia  [] Acute blood loss anemia

## 2021-04-13 NOTE — PROGRESS NOTE ADULT - SUBJECTIVE AND OBJECTIVE BOX
40F asthma (x2 admissions in past) with h/o fall (01/2020) resulting left radicular LBP initially managed conservatively. Pt had a large L5/S1 disc herniation, which had resolved. In 09/2020 she started with right leg pain that had been worsening. Reports R>L leg pain. Denies bowel or bladder changes, saddle anesthesia. Denies weakness, numbness. Pt is now s/p an L4/5 laminectomy/discectomy (4/8/21).    4/8 POD0: L4-5 lami/discectomy  4/9 POD1: No acute o/n events. Reports incrs'd particularly this AM, sharp in low back. Denies radicular discomfort.  4/12 POD4: No acute overnight events. Pt doing well, however admits to some nausea and denies any bowel movements.  4/13 POD5: Pt reports that her pain is well controlled, currently a 5/10, satisfied. (+) BM's.    Vital Signs Last 24 Hrs  T(C): 36.9 (13 Apr 2021 08:31), Max: 37.3 (12 Apr 2021 15:12)  T(F): 98.4 (13 Apr 2021 08:31), Max: 99.2 (12 Apr 2021 15:12)  HR: 71 (13 Apr 2021 08:31) (58 - 72)  BP: 111/75 (13 Apr 2021 08:31) (99/65 - 118/79)  BP(mean): --  RR: 16 (13 Apr 2021 08:31) (15 - 16)  SpO2: 99% (13 Apr 2021 08:31) (95% - 99%)    PHYSICAL EXAM:  GEN: laying in bed, appears well, NAD  NEURO: AOx3. FC, OE spont, speech intact, face symmetric. CNII-XII intact. PERRL, EOMI. No pronator drift. MAEx4. 5/5 strength throughout. SILT  CV: RRR +S1/S2  PULM: CTAB  GI: Abd soft, NT/ND  EXT: ext warm, dry, nontender  WOUND: lumbar incision c/d/i    LABS:  cret                        10.9   16.38 )-----------( 391      ( 13 Apr 2021 09:07 )             36.4     04-12    132<L>  |  96  |  18  ----------------------------<  86  3.9   |  26  |  0.70    Ca    8.5      12 Apr 2021 06:24  Phos  3.4     04-12  Mg     2.1     04-12    Allergies: meloxicam (shortness of breath, tachycardia)    MEDICATIONS  (STANDING):  budesonide 160 MICROgram(s)/formoterol 4.5 MICROgram(s) Inhaler 2 Puff(s) Inhalation daily  BUpivacaine liposome 1.3% Injectable (no eMAR) 20 milliLiter(s) Local Injection Once  enoxaparin Injectable 40 milliGRAM(s) SubCutaneous two times a day  loratadine 10 milliGRAM(s) Oral daily  magnesium hydroxide Suspension 30 milliLiter(s) Oral daily  methocarbamol 750 milliGRAM(s) Oral every 8 hours  methylPREDNISolone   Oral   methylPREDNISolone 4 milliGRAM(s) Oral before breakfast  methylPREDNISolone 4 milliGRAM(s) Oral after lunch  methylPREDNISolone 4 milliGRAM(s) Oral after dinner  methylPREDNISolone 8 milliGRAM(s) Oral at bedtime  mineral oil enema 133 milliLiter(s) Rectal once  multivitamin 1 Tablet(s) Oral daily  naloxegol 12.5 milliGRAM(s) Oral daily  ondansetron   Disintegrating Tablet 4 milliGRAM(s) Oral every 6 hours  pantoprazole    Tablet 40 milliGRAM(s) Oral daily  polyethylene glycol 3350 17 Gram(s) Oral daily  povidone iodine 5% Nasal Swab 1 Application(s) Both Nostrils once  pregabalin 50 milliGRAM(s) Oral three times a day  senna 2 Tablet(s) Oral daily  sodium chloride 2 Gram(s) Oral every 6 hours    MEDICATIONS  (PRN):  ALBUTerol    90 MICROgram(s) HFA Inhaler 2 Puff(s) Inhalation every 6 hours PRN Shortness of Breath and/or Wheezing  HYDROmorphone  Injectable 0.5 milliGRAM(s) IV Push every 3 hours PRN breakthrough pain  metoclopramide Injectable 10 milliGRAM(s) IV Push every 6 hours PRN nausea an vomitting  ondansetron Injectable 4 milliGRAM(s) IV Push every 6 hours PRN Nausea and/or Vomiting  oxyCODONE    IR 5 milliGRAM(s) Oral every 4 hours PRN Moderate Pain (4 - 6)  oxyCODONE    IR 10 milliGRAM(s) Oral every 4 hours PRN Severe Pain (7 - 10)    40F asthma (x2 admissions in past) now s/p an L4/5 laminectomy/discectomy (4/8/21). Progressing appropriately postop.    - cont oxycodone 5-10mg q4h prn moderate-severe pain  - cont dilaudid 0.5mg IV q3h prn breakthrough pain  - cont pregabalin 50mg tid  - cont methocarbamol to 750mg po q8h  - pain service will cont to follow

## 2021-04-14 ENCOUNTER — TRANSCRIPTION ENCOUNTER (OUTPATIENT)
Age: 41
End: 2021-04-14

## 2021-04-14 VITALS
SYSTOLIC BLOOD PRESSURE: 101 MMHG | HEART RATE: 82 BPM | TEMPERATURE: 99 F | OXYGEN SATURATION: 99 % | DIASTOLIC BLOOD PRESSURE: 67 MMHG | RESPIRATION RATE: 17 BRPM

## 2021-04-14 LAB
ANION GAP SERPL CALC-SCNC: 7 MMOL/L — SIGNIFICANT CHANGE UP (ref 5–17)
BUN SERPL-MCNC: 14 MG/DL — SIGNIFICANT CHANGE UP (ref 7–23)
CALCIUM SERPL-MCNC: 8.7 MG/DL — SIGNIFICANT CHANGE UP (ref 8.4–10.5)
CHLORIDE SERPL-SCNC: 98 MMOL/L — SIGNIFICANT CHANGE UP (ref 96–108)
CO2 SERPL-SCNC: 30 MMOL/L — SIGNIFICANT CHANGE UP (ref 22–31)
CREAT SERPL-MCNC: 0.68 MG/DL — SIGNIFICANT CHANGE UP (ref 0.5–1.3)
GLUCOSE SERPL-MCNC: 102 MG/DL — HIGH (ref 70–99)
HCT VFR BLD CALC: 36.5 % — SIGNIFICANT CHANGE UP (ref 34.5–45)
HGB BLD-MCNC: 11 G/DL — LOW (ref 11.5–15.5)
MAGNESIUM SERPL-MCNC: 2.1 MG/DL — SIGNIFICANT CHANGE UP (ref 1.6–2.6)
MCHC RBC-ENTMCNC: 23.2 PG — LOW (ref 27–34)
MCHC RBC-ENTMCNC: 30.1 GM/DL — LOW (ref 32–36)
MCV RBC AUTO: 76.8 FL — LOW (ref 80–100)
NRBC # BLD: 0 /100 WBCS — SIGNIFICANT CHANGE UP (ref 0–0)
PHOSPHATE SERPL-MCNC: 3.1 MG/DL — SIGNIFICANT CHANGE UP (ref 2.5–4.5)
PLATELET # BLD AUTO: 459 K/UL — HIGH (ref 150–400)
POTASSIUM SERPL-MCNC: 4.1 MMOL/L — SIGNIFICANT CHANGE UP (ref 3.5–5.3)
POTASSIUM SERPL-SCNC: 4.1 MMOL/L — SIGNIFICANT CHANGE UP (ref 3.5–5.3)
RBC # BLD: 4.75 M/UL — SIGNIFICANT CHANGE UP (ref 3.8–5.2)
RBC # FLD: 18.1 % — HIGH (ref 10.3–14.5)
SODIUM SERPL-SCNC: 135 MMOL/L — SIGNIFICANT CHANGE UP (ref 135–145)
WBC # BLD: 14.49 K/UL — HIGH (ref 3.8–10.5)
WBC # FLD AUTO: 14.49 K/UL — HIGH (ref 3.8–10.5)

## 2021-04-14 PROCEDURE — 83036 HEMOGLOBIN GLYCOSYLATED A1C: CPT

## 2021-04-14 PROCEDURE — 97530 THERAPEUTIC ACTIVITIES: CPT

## 2021-04-14 PROCEDURE — 76000 FLUOROSCOPY <1 HR PHYS/QHP: CPT

## 2021-04-14 PROCEDURE — 97161 PT EVAL LOW COMPLEX 20 MIN: CPT

## 2021-04-14 PROCEDURE — 99024 POSTOP FOLLOW-UP VISIT: CPT

## 2021-04-14 PROCEDURE — C1889: CPT

## 2021-04-14 PROCEDURE — 86850 RBC ANTIBODY SCREEN: CPT

## 2021-04-14 PROCEDURE — 84100 ASSAY OF PHOSPHORUS: CPT

## 2021-04-14 PROCEDURE — 94640 AIRWAY INHALATION TREATMENT: CPT

## 2021-04-14 PROCEDURE — 86900 BLOOD TYPING SEROLOGIC ABO: CPT

## 2021-04-14 PROCEDURE — 83735 ASSAY OF MAGNESIUM: CPT

## 2021-04-14 PROCEDURE — 85027 COMPLETE CBC AUTOMATED: CPT

## 2021-04-14 PROCEDURE — 99238 HOSP IP/OBS DSCHRG MGMT 30/<: CPT

## 2021-04-14 PROCEDURE — 86769 SARS-COV-2 COVID-19 ANTIBODY: CPT

## 2021-04-14 PROCEDURE — C9399: CPT

## 2021-04-14 PROCEDURE — 97116 GAIT TRAINING THERAPY: CPT

## 2021-04-14 PROCEDURE — 80048 BASIC METABOLIC PNL TOTAL CA: CPT

## 2021-04-14 PROCEDURE — 36415 COLL VENOUS BLD VENIPUNCTURE: CPT

## 2021-04-14 PROCEDURE — 86901 BLOOD TYPING SEROLOGIC RH(D): CPT

## 2021-04-14 PROCEDURE — 82962 GLUCOSE BLOOD TEST: CPT

## 2021-04-14 RX ORDER — SENNA PLUS 8.6 MG/1
2 TABLET ORAL
Qty: 7 | Refills: 0
Start: 2021-04-14

## 2021-04-14 RX ORDER — POLYETHYLENE GLYCOL 3350 17 G/17G
17 POWDER, FOR SOLUTION ORAL
Qty: 119 | Refills: 0
Start: 2021-04-14 | End: 2021-04-20

## 2021-04-14 RX ORDER — PANTOPRAZOLE SODIUM 20 MG/1
1 TABLET, DELAYED RELEASE ORAL
Qty: 5 | Refills: 0
Start: 2021-04-14 | End: 2021-04-18

## 2021-04-14 RX ORDER — LORATADINE 10 MG/1
1 TABLET ORAL
Qty: 0 | Refills: 0 | DISCHARGE
Start: 2021-04-14

## 2021-04-14 RX ORDER — BUDESONIDE AND FORMOTEROL FUMARATE DIHYDRATE 160; 4.5 UG/1; UG/1
0 AEROSOL RESPIRATORY (INHALATION)
Qty: 0 | Refills: 0 | DISCHARGE
Start: 2021-04-14

## 2021-04-14 RX ORDER — METHOCARBAMOL 500 MG/1
1 TABLET, FILM COATED ORAL
Qty: 21 | Refills: 0
Start: 2021-04-14 | End: 2021-04-20

## 2021-04-14 RX ADMIN — Medication 4 MILLIGRAM(S): at 06:39

## 2021-04-14 RX ADMIN — MAGNESIUM HYDROXIDE 30 MILLILITER(S): 400 TABLET, CHEWABLE ORAL at 12:07

## 2021-04-14 RX ADMIN — OXYCODONE HYDROCHLORIDE 10 MILLIGRAM(S): 5 TABLET ORAL at 03:31

## 2021-04-14 RX ADMIN — LORATADINE 10 MILLIGRAM(S): 10 TABLET ORAL at 12:06

## 2021-04-14 RX ADMIN — ENOXAPARIN SODIUM 40 MILLIGRAM(S): 100 INJECTION SUBCUTANEOUS at 06:38

## 2021-04-14 RX ADMIN — Medication 1 TABLET(S): at 12:06

## 2021-04-14 RX ADMIN — OXYCODONE HYDROCHLORIDE 5 MILLIGRAM(S): 5 TABLET ORAL at 11:55

## 2021-04-14 RX ADMIN — METHOCARBAMOL 750 MILLIGRAM(S): 500 TABLET, FILM COATED ORAL at 14:37

## 2021-04-14 RX ADMIN — OXYCODONE HYDROCHLORIDE 5 MILLIGRAM(S): 5 TABLET ORAL at 13:04

## 2021-04-14 RX ADMIN — ONDANSETRON 4 MILLIGRAM(S): 8 TABLET, FILM COATED ORAL at 06:39

## 2021-04-14 RX ADMIN — OXYCODONE HYDROCHLORIDE 10 MILLIGRAM(S): 5 TABLET ORAL at 16:21

## 2021-04-14 RX ADMIN — Medication 4 MILLIGRAM(S): at 13:29

## 2021-04-14 RX ADMIN — OXYCODONE HYDROCHLORIDE 10 MILLIGRAM(S): 5 TABLET ORAL at 06:38

## 2021-04-14 RX ADMIN — Medication 50 MILLIGRAM(S): at 13:29

## 2021-04-14 RX ADMIN — METHOCARBAMOL 750 MILLIGRAM(S): 500 TABLET, FILM COATED ORAL at 06:39

## 2021-04-14 RX ADMIN — OXYCODONE HYDROCHLORIDE 10 MILLIGRAM(S): 5 TABLET ORAL at 13:04

## 2021-04-14 RX ADMIN — Medication 50 MILLIGRAM(S): at 06:39

## 2021-04-14 RX ADMIN — BUDESONIDE AND FORMOTEROL FUMARATE DIHYDRATE 2 PUFF(S): 160; 4.5 AEROSOL RESPIRATORY (INHALATION) at 12:07

## 2021-04-14 RX ADMIN — SODIUM CHLORIDE 2 GRAM(S): 9 INJECTION INTRAMUSCULAR; INTRAVENOUS; SUBCUTANEOUS at 06:39

## 2021-04-14 RX ADMIN — OXYCODONE HYDROCHLORIDE 10 MILLIGRAM(S): 5 TABLET ORAL at 02:31

## 2021-04-14 RX ADMIN — SODIUM CHLORIDE 2 GRAM(S): 9 INJECTION INTRAMUSCULAR; INTRAVENOUS; SUBCUTANEOUS at 13:30

## 2021-04-14 RX ADMIN — OXYCODONE HYDROCHLORIDE 10 MILLIGRAM(S): 5 TABLET ORAL at 17:21

## 2021-04-14 RX ADMIN — OXYCODONE HYDROCHLORIDE 5 MILLIGRAM(S): 5 TABLET ORAL at 12:04

## 2021-04-14 RX ADMIN — POLYETHYLENE GLYCOL 3350 17 GRAM(S): 17 POWDER, FOR SOLUTION ORAL at 12:07

## 2021-04-14 RX ADMIN — PANTOPRAZOLE SODIUM 40 MILLIGRAM(S): 20 TABLET, DELAYED RELEASE ORAL at 12:06

## 2021-04-14 RX ADMIN — OXYCODONE HYDROCHLORIDE 10 MILLIGRAM(S): 5 TABLET ORAL at 07:38

## 2021-04-14 RX ADMIN — SENNA PLUS 2 TABLET(S): 8.6 TABLET ORAL at 12:07

## 2021-04-14 NOTE — PROGRESS NOTE ADULT - NSICDXPILOT_GEN_ALL_CORE
[place]

## 2021-04-14 NOTE — DISCHARGE NOTE NURSING/CASE MANAGEMENT/SOCIAL WORK - PATIENT PORTAL LINK FT
You can access the FollowMyHealth Patient Portal offered by [facility] by registering at the following website: [email or web address]. By joining [employer]’s FollowMyHealth portal, you will also be able to view your health information using other applications (apps) compatible with our system.

## 2021-04-14 NOTE — PROGRESS NOTE ADULT - PROVIDER SPECIALTY LIST ADULT
Neurosurgery
Neurosurgery
Pain Medicine
Pain Medicine
Neurosurgery
Neurosurgery
Pain Medicine
Neurosurgery
Pain Medicine

## 2021-04-14 NOTE — PROGRESS NOTE ADULT - SUBJECTIVE AND OBJECTIVE BOX
40F asthma (x2 admissions in past) with h/o fall (01/2020) resulting left radicular LBP initially managed conservatively. Pt had a large L5/S1 disc herniation, which had resolved. In 09/2020 she started with right leg pain that had been worsening. Reports R>L leg pain. Denies bowel or bladder changes, saddle anesthesia. Denies weakness, numbness. Pt is now s/p an L4/5 laminectomy/discectomy (4/8/21).    4/8 POD0: L4-5 lami/discectomy  4/9 POD1: No acute o/n events. Reports incrs'd particularly this AM, sharp in low back. Denies radicular discomfort.  4/12 POD4: No acute overnight events. Pt doing well, however admits to some nausea and denies any bowel movements.  4/13 POD5: Pt reports that her pain is well controlled, currently a 5/10, satisfied. (+) BM's.  4/14 POD6: Pain well controlled. No acute overnight events.    Vital Signs Last 24 Hrs  T(C): 37.1 (14 Apr 2021 08:44), Max: 37.1 (13 Apr 2021 15:32)  T(F): 98.8 (14 Apr 2021 08:44), Max: 98.8 (14 Apr 2021 08:44)  HR: 87 (14 Apr 2021 08:44) (55 - 87)  BP: 109/70 (14 Apr 2021 08:44) (105/68 - 112/78)  BP(mean): --  RR: 16 (14 Apr 2021 08:44) (15 - 16)  SpO2: 97% (14 Apr 2021 08:44) (94% - 97%)    PHYSICAL EXAM:  GEN: laying in bed, appears well, NAD  NEURO: AOx3. FC, OE spont, speech intact, face symmetric. CNII-XII intact. PERRL, EOMI. No pronator drift. MAEx4. 5/5 strength throughout. SILT  CV: RRR +S1/S2  PULM: CTAB  GI: Abd soft, NT/ND  EXT: ext warm, dry, nontender  WOUND: lumbar incision c/d/i    LABS:  cret                        11.0   14.49 )-----------( 459      ( 14 Apr 2021 08:33 )             36.5     04-14    135  |  98  |  14  ----------------------------<  102<H>  4.1   |  30  |  0.68    Ca    8.7      14 Apr 2021 08:33  Phos  3.1     04-14  Mg     2.1     04-14    Allergies: meloxicam (shortness of breath, tachycardia)    MEDICATIONS  (STANDING):  budesonide 160 MICROgram(s)/formoterol 4.5 MICROgram(s) Inhaler 2 Puff(s) Inhalation daily  BUpivacaine liposome 1.3% Injectable (no eMAR) 20 milliLiter(s) Local Injection Once  enoxaparin Injectable 40 milliGRAM(s) SubCutaneous two times a day  loratadine 10 milliGRAM(s) Oral daily  magnesium hydroxide Suspension 30 milliLiter(s) Oral daily  methocarbamol 750 milliGRAM(s) Oral every 8 hours  methylPREDNISolone 4 milliGRAM(s) Oral at bedtime  methylPREDNISolone   Oral   methylPREDNISolone 4 milliGRAM(s) Oral before breakfast  methylPREDNISolone 4 milliGRAM(s) Oral after lunch  methylPREDNISolone 4 milliGRAM(s) Oral after dinner  multivitamin 1 Tablet(s) Oral daily  ondansetron   Disintegrating Tablet 4 milliGRAM(s) Oral every 6 hours  pantoprazole    Tablet 40 milliGRAM(s) Oral daily  polyethylene glycol 3350 17 Gram(s) Oral daily  povidone iodine 5% Nasal Swab 1 Application(s) Both Nostrils once  pregabalin 50 milliGRAM(s) Oral three times a day  senna 2 Tablet(s) Oral daily  sodium chloride 2 Gram(s) Oral every 6 hours    MEDICATIONS  (PRN):  ALBUTerol    90 MICROgram(s) HFA Inhaler 2 Puff(s) Inhalation every 6 hours PRN Shortness of Breath and/or Wheezing  HYDROmorphone  Injectable 0.5 milliGRAM(s) IV Push every 3 hours PRN breakthrough pain  metoclopramide Injectable 10 milliGRAM(s) IV Push every 6 hours PRN nausea an vomitting  ondansetron Injectable 4 milliGRAM(s) IV Push every 6 hours PRN Nausea and/or Vomiting  oxyCODONE    IR 10 milliGRAM(s) Oral every 4 hours PRN Severe Pain (7 - 10)  oxyCODONE    IR 5 milliGRAM(s) Oral every 4 hours PRN Moderate Pain (4 - 6)    40F asthma (x2 admissions in past) now s/p an L4/5 laminectomy/discectomy (4/8/21). Progressing appropriately postop.    - cont oxycodone 5-10mg q4h prn moderate-severe pain  - cont dilaudid 0.5mg IV q3h prn breakthrough pain  - cont pregabalin 50mg tid  - cont methocarbamol to 750mg po q8h  - pain service will cont to follow

## 2021-04-14 NOTE — DISCHARGE NOTE NURSING/CASE MANAGEMENT/SOCIAL WORK - NSDCFUADDAPPT_GEN_ALL_CORE_FT
Please call Dr. Ayers's office at [phone] to schedule your follow up appointment.     Please call Dr. Snowden's office at [phone] to schedule your follow up appointment with pain management.  -  -Please also make a follow up appointment with your primary care doctor.

## 2021-04-14 NOTE — PROGRESS NOTE ADULT - SUBJECTIVE AND OBJECTIVE BOX
HPI:  39 y/o female with PMHx Asthma presents for spine surgery today.  Her history includes slip and fall 1/2020 with resulting low back pain and left leg pain managed conservatively.  At that time she had a large L5/S1 disc herniation, which has resolved.  In 9/2020 she started with right leg pain that has been worsening.  She reports right greater than left leg pain.  She denies bowel or bladder changes, saddle anesthesia.  She denies weakness, numbness. (08 Apr 2021 06:47)    Hospital Course:   4/8: POD0 L4-5 lami/discectomy  4/9: POD1. KEILA o/n, neuro stable. decadron 4q6  4/10: POD2. KEILA. Neuro stable. Continuing decadron 4q6; to be discharged on medrol dosepak.  4/11: POD 3. Worked with PT, had difficulty with stairs. Dispo home pending improvement with stairs.   4/12: POD 4. Pain controlled at rest, but severe pain with stairs. f/u pain management recs. for home with Home PT pending improvement with stairs.  4/13 POD 5. Pain well managed, pt still have persistent R leg pain at the hip. Continues to work with PT for stairs.  4/14: POD 6 KEILA overnight. Neuro exam stable. PT recs pending stairs    Vital Signs Last 24 Hrs  T(C): 36.4 (13 Apr 2021 20:35), Max: 37.1 (13 Apr 2021 15:32)  T(F): 97.5 (13 Apr 2021 20:35), Max: 98.7 (13 Apr 2021 15:32)  HR: 85 (13 Apr 2021 20:35) (63 - 85)  BP: 109/74 (13 Apr 2021 22:25) (99/65 - 116/69)  BP(mean): --  RR: 16 (13 Apr 2021 20:35) (15 - 16)  SpO2: 94% (13 Apr 2021 20:35) (94% - 99%)    I&O's Summary    12 Apr 2021 07:01  -  13 Apr 2021 07:00  --------------------------------------------------------  IN: 240 mL / OUT: 450 mL / NET: -210 mL        PHYSICAL EXAM:  GEN: laying in bed, appears well, NAD  NEURO: AOx3. FC, OE spont, speech intact, face symmetric. CNII-XII intact. PERRL, EOMI. No pronator drift. MAEx4. 5/5 strength throughout. RLE limited by pain. Sensation intact to light touch throughout.  CV: RRR +S1/S2  PULM: CTAB  GI: Abd soft, NT/ND  EXT: ext warm, dry, nontender  WOUND: lumbar incision c/d/i    TUBES/LINES:  [] CVC  [] A-line  [] Lumbar Drain  [] Ventriculostomy  [] Other    DIET:  [] NPO  [x] Mechanical  [] Tube feeds    LABS:                        10.9   16.38 )-----------( 391      ( 13 Apr 2021 09:07 )             36.4     04-13    135  |  99  |  19  ----------------------------<  115<H>  3.9   |  28  |  0.77    Ca    8.4      13 Apr 2021 09:07  Phos  3.0     04-13  Mg     2.0     04-13              CAPILLARY BLOOD GLUCOSE          Drug Levels: [] N/A    CSF Analysis: [] N/A      Allergies    meloxicam (Short breath)    Intolerances      MEDICATIONS:  Antibiotics:    Neuro:  HYDROmorphone  Injectable 0.5 milliGRAM(s) IV Push every 3 hours PRN  methocarbamol 750 milliGRAM(s) Oral every 8 hours  metoclopramide Injectable 10 milliGRAM(s) IV Push every 6 hours PRN  ondansetron   Disintegrating Tablet 4 milliGRAM(s) Oral every 6 hours  ondansetron Injectable 4 milliGRAM(s) IV Push every 6 hours PRN  oxyCODONE    IR 5 milliGRAM(s) Oral every 4 hours PRN  oxyCODONE    IR 10 milliGRAM(s) Oral every 4 hours PRN  pregabalin 50 milliGRAM(s) Oral three times a day    Anticoagulation:  enoxaparin Injectable 40 milliGRAM(s) SubCutaneous two times a day    OTHER:  ALBUTerol    90 MICROgram(s) HFA Inhaler 2 Puff(s) Inhalation every 6 hours PRN  budesonide 160 MICROgram(s)/formoterol 4.5 MICROgram(s) Inhaler 2 Puff(s) Inhalation daily  BUpivacaine liposome 1.3% Injectable (no eMAR) 20 milliLiter(s) Local Injection Once  loratadine 10 milliGRAM(s) Oral daily  magnesium hydroxide Suspension 30 milliLiter(s) Oral daily  methylPREDNISolone   Oral   methylPREDNISolone 4 milliGRAM(s) Oral before breakfast  methylPREDNISolone 4 milliGRAM(s) Oral after lunch  methylPREDNISolone 4 milliGRAM(s) Oral after dinner  methylPREDNISolone 4 milliGRAM(s) Oral at bedtime  pantoprazole    Tablet 40 milliGRAM(s) Oral daily  polyethylene glycol 3350 17 Gram(s) Oral daily  povidone iodine 5% Nasal Swab 1 Application(s) Both Nostrils once  senna 2 Tablet(s) Oral daily    IVF:  multivitamin 1 Tablet(s) Oral daily  sodium chloride 2 Gram(s) Oral every 6 hours    CULTURES:    RADIOLOGY & ADDITIONAL TESTS:      ASSESSMENT:  41yo Female PMHx asthma, s/p  S/P L4-5 lumbar laminectomy with discectomy (4/8)    M51.26    Handoff    MEWS Score    Obesity    Asthma    Lumbar disc disorder with myelopathy    Radiculopathy affecting upper extremity    COVID-19    Anemia    Heavy menses    Sciatica    Lumbar disc herniation    Lumbar disc herniation    Lumbar laminectomy with discectomy by posterior approach    Lumbar herniated disc    Lumbar laminectomy with discectomy by posterior approach    No significant past surgical history    Mild persistent asthma    SysAdmin_VstLnk        PLAN:  NEURO:  - neuro checks q4h  - pain control, ERAS  - dc on medrol dose pack   - no drains, no imaging  - PT/OT    CARDIOVASCULAR:  - normotensive SBP goal     PULMONARY:  - IS  - cont albuterol/symbicort    RENAL:  - repletions prn   - voiding    GI:  - regular diet  - bowel regimen   - PPI for GI ppx while on steroids    HEME:  - h/h stable  - SCDs, SQL 40mg BID    ID:  - afebrile  - leukocytosis likely reactive post op/secondary to steroids  - COVID Ab positive    ENDO:  - glucose goal 140-180    DVT PROPHYLAXIS:  [x] Venodynes                                [x] Heparin/Lovenox    DISPOSITION: full code, stable, dispo pending  for home with home PT    D/w Dr. Ayers      Assessment:  Present when checked    []  GCS  E   V  M     Heart Failure: []Acute, [] acute on chronic , []chronic  Heart Failure:  [] Diastolic (HFpEF), [] Systolic (HFrEF), []Combined (HFpEF and HFrEF), [] RHF, [] Pulm HTN, [] Other    [] EUNICE, [] ATN, [] AIN, [] other  [] CKD1, [] CKD2, [] CKD 3, [] CKD 4, [] CKD 5, []ESRD    Encephalopathy: [] Metabolic, [] Hepatic, [] toxic, [] Neurological, [] Other    Abnormal Nurtitional Status: [] malnurtition (see nutrition note), [ ]underweight: BMI < 19, [] morbid obesity: BMI >40, [] Cachexia    [] Sepsis  [] hypovolemic shock,[] cardiogenic shock, [] hemorrhagic shock, [] neuogenic shock  [] Acute Respiratory Failure  []Cerebral edema, [] Brain compression/ herniation,   [] Functional quadriplegia  [] Acute blood loss anemia

## 2021-04-14 NOTE — PROGRESS NOTE ADULT - REASON FOR ADMISSION
right leg pain

## 2021-04-20 DIAGNOSIS — M48.061 SPINAL STENOSIS, LUMBAR REGION WITHOUT NEUROGENIC CLAUDICATION: ICD-10-CM

## 2021-04-20 DIAGNOSIS — D64.9 ANEMIA, UNSPECIFIED: ICD-10-CM

## 2021-04-20 DIAGNOSIS — M51.06 INTERVERTEBRAL DISC DISORDERS WITH MYELOPATHY, LUMBAR REGION: ICD-10-CM

## 2021-04-20 DIAGNOSIS — E66.9 OBESITY, UNSPECIFIED: ICD-10-CM

## 2021-04-20 DIAGNOSIS — J45.30 MILD PERSISTENT ASTHMA, UNCOMPLICATED: ICD-10-CM

## 2021-04-20 DIAGNOSIS — D72.829 ELEVATED WHITE BLOOD CELL COUNT, UNSPECIFIED: ICD-10-CM

## 2021-04-20 DIAGNOSIS — M51.16 INTERVERTEBRAL DISC DISORDERS WITH RADICULOPATHY, LUMBAR REGION: ICD-10-CM

## 2021-04-21 ENCOUNTER — APPOINTMENT (OUTPATIENT)
Dept: SPINE | Facility: CLINIC | Age: 41
End: 2021-04-21
Payer: MEDICAID

## 2021-04-21 VITALS
RESPIRATION RATE: 16 BRPM | BODY MASS INDEX: 36.32 KG/M2 | WEIGHT: 218 LBS | OXYGEN SATURATION: 98 % | SYSTOLIC BLOOD PRESSURE: 128 MMHG | HEIGHT: 65 IN | DIASTOLIC BLOOD PRESSURE: 80 MMHG | TEMPERATURE: 97.3 F | HEART RATE: 83 BPM

## 2021-04-21 DIAGNOSIS — M62.830 MUSCLE SPASM OF BACK: ICD-10-CM

## 2021-04-21 DIAGNOSIS — Z48.02 ENCOUNTER FOR REMOVAL OF SUTURES: ICD-10-CM

## 2021-04-21 DIAGNOSIS — G89.18 OTHER ACUTE POSTPROCEDURAL PAIN: ICD-10-CM

## 2021-04-21 PROCEDURE — 99024 POSTOP FOLLOW-UP VISIT: CPT

## 2021-04-21 RX ORDER — METHOCARBAMOL 500 MG/1
500 TABLET, FILM COATED ORAL
Qty: 28 | Refills: 0 | Status: ACTIVE | COMMUNITY

## 2021-05-19 ENCOUNTER — APPOINTMENT (OUTPATIENT)
Dept: SPINE | Facility: CLINIC | Age: 41
End: 2021-05-19
Payer: MEDICAID

## 2021-05-19 VITALS
SYSTOLIC BLOOD PRESSURE: 122 MMHG | BODY MASS INDEX: 36.32 KG/M2 | HEIGHT: 65 IN | WEIGHT: 218 LBS | RESPIRATION RATE: 18 BRPM | HEART RATE: 76 BPM | OXYGEN SATURATION: 98 % | DIASTOLIC BLOOD PRESSURE: 83 MMHG | TEMPERATURE: 97 F

## 2021-05-19 DIAGNOSIS — Z51.89 ENCOUNTER FOR OTHER SPECIFIED AFTERCARE: ICD-10-CM

## 2021-05-19 DIAGNOSIS — Z09 ENCOUNTER FOR FOLLOW-UP EXAMINATION AFTER COMPLETED TREATMENT FOR CONDITIONS OTHER THAN MALIGNANT NEOPLASM: ICD-10-CM

## 2021-05-19 PROCEDURE — 99024 POSTOP FOLLOW-UP VISIT: CPT

## 2021-05-19 RX ORDER — OXYCODONE HYDROCHLORIDE AND ACETAMINOPHEN 5; 325 MG/1; MG/1
5-325 TABLET ORAL
Refills: 0 | Status: DISCONTINUED | COMMUNITY
End: 2021-05-19

## 2021-05-19 RX ORDER — IBUPROFEN 800 MG/1
800 TABLET, FILM COATED ORAL 3 TIMES DAILY
Qty: 90 | Refills: 0 | Status: ACTIVE | COMMUNITY
Start: 2021-04-21 | End: 1900-01-01

## 2021-05-19 RX ORDER — METHOCARBAMOL 500 MG/1
500 TABLET, FILM COATED ORAL
Qty: 28 | Refills: 0 | Status: DISCONTINUED | COMMUNITY
Start: 2021-04-21 | End: 2021-05-19

## 2021-06-30 ENCOUNTER — APPOINTMENT (OUTPATIENT)
Dept: SPINE | Facility: CLINIC | Age: 41
End: 2021-06-30

## 2021-07-07 ENCOUNTER — APPOINTMENT (OUTPATIENT)
Dept: SPINE | Facility: CLINIC | Age: 41
End: 2021-07-07

## 2021-07-09 ENCOUNTER — APPOINTMENT (OUTPATIENT)
Dept: SPINE | Facility: CLINIC | Age: 41
End: 2021-07-09

## 2021-07-13 ENCOUNTER — APPOINTMENT (OUTPATIENT)
Dept: SPINE | Facility: CLINIC | Age: 41
End: 2021-07-13
Payer: MEDICAID

## 2021-07-13 VITALS
DIASTOLIC BLOOD PRESSURE: 82 MMHG | HEIGHT: 65 IN | TEMPERATURE: 98 F | WEIGHT: 218 LBS | OXYGEN SATURATION: 97 % | BODY MASS INDEX: 36.32 KG/M2 | SYSTOLIC BLOOD PRESSURE: 121 MMHG | HEART RATE: 73 BPM | RESPIRATION RATE: 14 BRPM

## 2021-07-13 PROCEDURE — 99213 OFFICE O/P EST LOW 20 MIN: CPT

## 2021-08-30 ENCOUNTER — APPOINTMENT (OUTPATIENT)
Dept: NEUROLOGY | Facility: CLINIC | Age: 41
End: 2021-08-30
Payer: MEDICAID

## 2021-08-30 VITALS
HEART RATE: 87 BPM | OXYGEN SATURATION: 98 % | HEIGHT: 65 IN | WEIGHT: 219 LBS | SYSTOLIC BLOOD PRESSURE: 108 MMHG | DIASTOLIC BLOOD PRESSURE: 77 MMHG | TEMPERATURE: 96.6 F | BODY MASS INDEX: 36.49 KG/M2

## 2021-08-30 DIAGNOSIS — M54.2 CERVICALGIA: ICD-10-CM

## 2021-08-30 DIAGNOSIS — R20.2 PARESTHESIA OF SKIN: ICD-10-CM

## 2021-08-30 PROCEDURE — 99204 OFFICE O/P NEW MOD 45 MIN: CPT

## 2021-08-30 NOTE — ASSESSMENT
[FreeTextEntry1] : Neck and arm symptoms are not consistent with a neurologic etiology\par \par Hand symptoms may be due to carpal tunnel syndrome, although not entirely typical\par \par Will have her return for NCS/EMG\par \par If negative no further neurologic workup

## 2021-08-30 NOTE — HISTORY OF PRESENT ILLNESS
[FreeTextEntry1] : 40 year old woman referred by Dr. Ayers for pain in her neck and arms\par Symptoms started about 6 months ago, worse with lying down, worse on the left than the right\par She also states her fingers get white when she carries bags, and pain in the left medial elbow \par She takes gabapentin which is helping her sleep \par \par She had lumbar laminectomy / discectomy at L4/5 in April 2021\par \par Personally reviewed and interpreted:\par MRI C Spine 4/2021 - mild multilevel degenerative disease without cord or nerve root compression

## 2021-08-30 NOTE — CONSULT LETTER
[Dear  ___] : Dear  [unfilled], [Consult Letter:] : I had the pleasure of evaluating your patient, [unfilled]. [Please see my note below.] : Please see my note below. [Consult Closing:] : Thank you very much for allowing me to participate in the care of this patient.  If you have any questions, please do not hesitate to contact me. [Sincerely,] : Sincerely, [FreeTextEntry3] : Jevon Townsend M.D.\par Neurology, Electromyography and Neuromuscular Medicine\par [facility]\par \par  of Neurology\par \[facility] [place] [facility]\"" / [employer]

## 2021-08-30 NOTE — PHYSICAL EXAM
[FreeTextEntry1] : Motor: strength throughout\par Sensory: light touch, pinprick intact and symmetric in UE b/l\par Reflexes: 2+ symmetric throughout\par Gait: normal\par Tinel's sign absent at wrists b/l; Phalen's sign equivocal (endorsed burning but no tingling sensation)

## 2021-09-21 ENCOUNTER — APPOINTMENT (OUTPATIENT)
Dept: NEUROLOGY | Facility: CLINIC | Age: 41
End: 2021-09-21
Payer: MEDICAID

## 2021-09-21 VITALS
BODY MASS INDEX: 36.32 KG/M2 | HEART RATE: 78 BPM | WEIGHT: 218 LBS | DIASTOLIC BLOOD PRESSURE: 77 MMHG | HEIGHT: 65 IN | OXYGEN SATURATION: 96 % | SYSTOLIC BLOOD PRESSURE: 109 MMHG | TEMPERATURE: 97.2 F

## 2021-09-21 PROCEDURE — 99214 OFFICE O/P EST MOD 30 MIN: CPT | Mod: 25

## 2021-09-21 PROCEDURE — 95913 NRV CNDJ TEST 13/> STUDIES: CPT

## 2021-09-21 PROCEDURE — 95885 MUSC TST DONE W/NERV TST LIM: CPT

## 2021-09-21 NOTE — PROCEDURE
[FreeTextEntry1] : \par Nerve Conduction and Electromyography Report [FreeTextEntry3] : \par Electro Physiologic Findings:\par \par Limb temperature was monitored and maintained at approximately 32 – 36° C in the upper extremities.\par \par The median sensory amplitudes were low bilaterally, and the sensory and mixed nerve responses were slow across the wrists, while the distal sensory velocities were normal. The median distal motor latencies were prolonged bilaterally, with partial conduction block of about 40% on the right and 25% on the left. The median motor amplitudes were normal bilaterally; there were Brody-Chiqui anastomoses bilaterally as well.\par \par The ulnar sensory responses were normal bilaterally and symmetric. The right ulnar motor response was mildly slow across the elbow without conduction block; the left ulnar motor response was normal. The left radial sensory response was normal. \par \par Needle electromyography was performed on the bilateral abductor pollicis brevis muscles. There was mild chronic denervation on the right, and a mildly reduced recruitment pattern on the left without definite denervation. 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 There were also Brody-Chiqui anastomoses bilaterally. \par \par There was a very mild right ulnar nerve entrapment at the elbow that is apparently asymptomatic at this time. There was no evidence of polyneuropathy on this upper extremity study.

## 2021-10-14 ENCOUNTER — APPOINTMENT (OUTPATIENT)
Dept: ORTHOPEDIC SURGERY | Facility: CLINIC | Age: 41
End: 2021-10-14
Payer: MEDICAID

## 2021-10-14 VITALS — WEIGHT: 216 LBS | BODY MASS INDEX: 35.99 KG/M2 | HEIGHT: 65 IN | RESPIRATION RATE: 16 BRPM

## 2021-10-14 DIAGNOSIS — R20.0 ANESTHESIA OF SKIN: ICD-10-CM

## 2021-10-14 DIAGNOSIS — Z78.9 OTHER SPECIFIED HEALTH STATUS: ICD-10-CM

## 2021-10-14 PROCEDURE — 99204 OFFICE O/P NEW MOD 45 MIN: CPT | Mod: 25

## 2021-10-14 PROCEDURE — 73110 X-RAY EXAM OF WRIST: CPT | Mod: RT

## 2021-10-14 PROCEDURE — 20526 THER INJECTION CARP TUNNEL: CPT | Mod: LT

## 2022-02-01 ENCOUNTER — TRANSCRIPTION ENCOUNTER (OUTPATIENT)
Age: 42
End: 2022-02-01

## 2022-02-03 ENCOUNTER — APPOINTMENT (OUTPATIENT)
Dept: ORTHOPEDIC SURGERY | Facility: CLINIC | Age: 42
End: 2022-02-03
Payer: MEDICAID

## 2022-02-03 DIAGNOSIS — M77.01 MEDIAL EPICONDYLITIS, RIGHT ELBOW: ICD-10-CM

## 2022-02-03 PROCEDURE — 99213 OFFICE O/P EST LOW 20 MIN: CPT | Mod: 25

## 2022-02-03 PROCEDURE — 20526 THER INJECTION CARP TUNNEL: CPT | Mod: RT

## 2022-02-08 ENCOUNTER — OUTPATIENT (OUTPATIENT)
Dept: OUTPATIENT SERVICES | Facility: HOSPITAL | Age: 42
LOS: 1 days | End: 2022-02-08
Payer: MEDICAID

## 2022-02-08 ENCOUNTER — APPOINTMENT (OUTPATIENT)
Dept: SPINE | Facility: CLINIC | Age: 42
End: 2022-02-08
Payer: MEDICAID

## 2022-02-08 ENCOUNTER — NON-APPOINTMENT (OUTPATIENT)
Age: 42
End: 2022-02-08

## 2022-02-08 ENCOUNTER — RESULT REVIEW (OUTPATIENT)
Age: 42
End: 2022-02-08

## 2022-02-08 VITALS
OXYGEN SATURATION: 98 % | WEIGHT: 205 LBS | SYSTOLIC BLOOD PRESSURE: 106 MMHG | HEIGHT: 65 IN | HEART RATE: 63 BPM | BODY MASS INDEX: 34.16 KG/M2 | RESPIRATION RATE: 18 BRPM | DIASTOLIC BLOOD PRESSURE: 74 MMHG | TEMPERATURE: 97 F

## 2022-02-08 DIAGNOSIS — M54.50 LOW BACK PAIN, UNSPECIFIED: ICD-10-CM

## 2022-02-08 PROCEDURE — 72110 X-RAY EXAM L-2 SPINE 4/>VWS: CPT | Mod: 26

## 2022-02-08 PROCEDURE — 99214 OFFICE O/P EST MOD 30 MIN: CPT

## 2022-02-08 PROCEDURE — 72110 X-RAY EXAM L-2 SPINE 4/>VWS: CPT

## 2022-02-08 RX ORDER — IBUPROFEN 600 MG/1
600 TABLET, FILM COATED ORAL 3 TIMES DAILY
Qty: 30 | Refills: 0 | Status: ACTIVE | COMMUNITY
Start: 2022-02-08 | End: 1900-01-01

## 2022-02-08 RX ORDER — CYCLOBENZAPRINE HYDROCHLORIDE 5 MG/1
5 TABLET, FILM COATED ORAL 3 TIMES DAILY
Qty: 15 | Refills: 0 | Status: ACTIVE | COMMUNITY
Start: 2022-02-08 | End: 1900-01-01

## 2022-03-09 ENCOUNTER — OUTPATIENT (OUTPATIENT)
Dept: OUTPATIENT SERVICES | Facility: HOSPITAL | Age: 42
LOS: 1 days | End: 2022-03-09
Payer: MEDICAID

## 2022-03-09 VITALS
DIASTOLIC BLOOD PRESSURE: 74 MMHG | WEIGHT: 164.02 LBS | RESPIRATION RATE: 16 BRPM | SYSTOLIC BLOOD PRESSURE: 112 MMHG | OXYGEN SATURATION: 99 % | TEMPERATURE: 99 F | HEART RATE: 70 BPM

## 2022-03-09 DIAGNOSIS — Z01.818 ENCOUNTER FOR OTHER PREPROCEDURAL EXAMINATION: ICD-10-CM

## 2022-03-09 DIAGNOSIS — Z30.2 ENCOUNTER FOR STERILIZATION: ICD-10-CM

## 2022-03-09 DIAGNOSIS — Z98.890 OTHER SPECIFIED POSTPROCEDURAL STATES: Chronic | ICD-10-CM

## 2022-03-09 LAB
BLD GP AB SCN SERPL QL: SIGNIFICANT CHANGE UP
HCG SERPL-ACNC: <1 MIU/ML — SIGNIFICANT CHANGE UP
HCT VFR BLD CALC: 40.3 % — SIGNIFICANT CHANGE UP (ref 34.5–45)
HGB BLD-MCNC: 13.5 G/DL — SIGNIFICANT CHANGE UP (ref 11.5–15.5)
MCHC RBC-ENTMCNC: 28 PG — SIGNIFICANT CHANGE UP (ref 27–34)
MCHC RBC-ENTMCNC: 33.5 GM/DL — SIGNIFICANT CHANGE UP (ref 32–36)
MCV RBC AUTO: 83.4 FL — SIGNIFICANT CHANGE UP (ref 80–100)
NRBC # BLD: 0 /100 WBCS — SIGNIFICANT CHANGE UP (ref 0–0)
PLATELET # BLD AUTO: 252 K/UL — SIGNIFICANT CHANGE UP (ref 150–400)
RBC # BLD: 4.83 M/UL — SIGNIFICANT CHANGE UP (ref 3.8–5.2)
RBC # FLD: 13.6 % — SIGNIFICANT CHANGE UP (ref 10.3–14.5)
WBC # BLD: 5.52 K/UL — SIGNIFICANT CHANGE UP (ref 3.8–10.5)
WBC # FLD AUTO: 5.52 K/UL — SIGNIFICANT CHANGE UP (ref 3.8–10.5)

## 2022-03-09 PROCEDURE — 86901 BLOOD TYPING SEROLOGIC RH(D): CPT

## 2022-03-09 PROCEDURE — 86900 BLOOD TYPING SEROLOGIC ABO: CPT

## 2022-03-09 PROCEDURE — 36415 COLL VENOUS BLD VENIPUNCTURE: CPT

## 2022-03-09 PROCEDURE — G0463: CPT

## 2022-03-09 PROCEDURE — 86850 RBC ANTIBODY SCREEN: CPT

## 2022-03-09 PROCEDURE — 85027 COMPLETE CBC AUTOMATED: CPT

## 2022-03-09 PROCEDURE — 84702 CHORIONIC GONADOTROPIN TEST: CPT

## 2022-03-09 RX ORDER — CETIRIZINE HYDROCHLORIDE 10 MG/1
1 TABLET ORAL
Qty: 0 | Refills: 0 | DISCHARGE

## 2022-03-09 RX ORDER — ALBUTEROL 90 UG/1
2 AEROSOL, METERED ORAL
Qty: 0 | Refills: 0 | DISCHARGE

## 2022-03-09 RX ORDER — FLUTICASONE FUROATE AND VILANTEROL TRIFENATATE 100; 25 UG/1; UG/1
1 POWDER RESPIRATORY (INHALATION)
Qty: 0 | Refills: 0 | DISCHARGE

## 2022-03-09 NOTE — H&P PST ADULT - HISTORY OF PRESENT ILLNESS
40 y/o female with no PMH here for PST. Pt desiring sterilization. . LMP - 3/4/2022. Pt denies abdominal and pelvic pain. Pt electing for laparoscopic bilateral salpingectomy for sterilization on 3/17/2022.

## 2022-03-09 NOTE — H&P PST ADULT - GENERAL COMMENTS
Pt went to [place] 2 weeks ago.  Pt denies having had the COVID19 infection and denies COVID19 positive contacts within the last 14 days.

## 2022-03-09 NOTE — H&P PST ADULT - NSICDXFAMILYHX_GEN_ALL_CORE_FT
FAMILY HISTORY:  Father  Still living? Yes, Estimated age: Age Unknown  FH: hyperlipidemia, Age at diagnosis: Age Unknown  FH: hypertension, Age at diagnosis: Age Unknown    Mother  Still living? Unknown  FH: hyperlipidemia, Age at diagnosis: Age Unknown

## 2022-03-09 NOTE — H&P PST ADULT - NSICDXPASTSURGICALHX_GEN_ALL_CORE_FT
PAST SURGICAL HISTORY:  S/P colonoscopy     S/P endoscopy     Status post embolization of uterine artery (2021)

## 2022-03-09 NOTE — H&P PST ADULT - FALL HARM RISK - UNIVERSAL INTERVENTIONS
Bed in lowest position, wheels locked, appropriate side rails in place/Call bell, personal items and telephone in reach/Instruct patient to call for assistance before getting out of bed or chair/Non-slip footwear when patient is out of bed/[place] to call system/Physically safe environment - no spills, clutter or unnecessary equipment/Purposeful Proactive Rounding/Room/bathroom lighting operational, light cord in reach

## 2022-03-09 NOTE — H&P PST ADULT - PROBLEM SELECTOR PLAN 2
No medical clearance needed as per surgeon. CBC, T&S and HCG ordered. Pre-op instructions and surgical scrubs given and pt verbalized understanding. COVID19 PCR testing to be done within 72 hours of surgery at [facility].

## 2022-03-14 PROBLEM — Z30.2 ENCOUNTER FOR STERILIZATION: Chronic | Status: ACTIVE | Noted: 2022-03-09

## 2022-03-15 ENCOUNTER — OUTPATIENT (OUTPATIENT)
Dept: OUTPATIENT SERVICES | Facility: HOSPITAL | Age: 42
LOS: 1 days | End: 2022-03-15
Payer: MEDICAID

## 2022-03-15 DIAGNOSIS — Z98.890 OTHER SPECIFIED POSTPROCEDURAL STATES: Chronic | ICD-10-CM

## 2022-03-15 DIAGNOSIS — Z20.828 CONTACT WITH AND (SUSPECTED) EXPOSURE TO OTHER VIRAL COMMUNICABLE DISEASES: ICD-10-CM

## 2022-03-15 LAB — SARS-COV-2 RNA SPEC QL NAA+PROBE: SIGNIFICANT CHANGE UP

## 2022-03-15 PROCEDURE — U0003: CPT

## 2022-03-15 PROCEDURE — U0005: CPT

## 2022-03-16 ENCOUNTER — TRANSCRIPTION ENCOUNTER (OUTPATIENT)
Age: 42
End: 2022-03-16

## 2022-03-16 NOTE — ASU PATIENT PROFILE, ADULT - FALL HARM RISK - UNIVERSAL INTERVENTIONS
Bed in lowest position, wheels locked, appropriate side rails in place/Call bell, personal items and telephone in reach/Instruct patient to call for assistance before getting out of bed or chair/Non-slip footwear when patient is out of bed/[place] to call system/Physically safe environment - no spills, clutter or unnecessary equipment/Purposeful Proactive Rounding/Room/bathroom lighting operational, light cord in reach

## 2022-03-17 ENCOUNTER — OUTPATIENT (OUTPATIENT)
Dept: OUTPATIENT SERVICES | Facility: HOSPITAL | Age: 42
LOS: 1 days | End: 2022-03-17
Payer: MEDICAID

## 2022-03-17 ENCOUNTER — RESULT REVIEW (OUTPATIENT)
Age: 42
End: 2022-03-17

## 2022-03-17 VITALS
RESPIRATION RATE: 12 BRPM | TEMPERATURE: 98 F | OXYGEN SATURATION: 97 % | HEART RATE: 71 BPM | SYSTOLIC BLOOD PRESSURE: 110 MMHG | DIASTOLIC BLOOD PRESSURE: 76 MMHG | WEIGHT: 164.02 LBS

## 2022-03-17 VITALS
SYSTOLIC BLOOD PRESSURE: 116 MMHG | OXYGEN SATURATION: 97 % | RESPIRATION RATE: 17 BRPM | DIASTOLIC BLOOD PRESSURE: 68 MMHG | HEART RATE: 68 BPM

## 2022-03-17 DIAGNOSIS — Z98.890 OTHER SPECIFIED POSTPROCEDURAL STATES: Chronic | ICD-10-CM

## 2022-03-17 DIAGNOSIS — Z30.2 ENCOUNTER FOR STERILIZATION: ICD-10-CM

## 2022-03-17 LAB — HCG UR QL: NEGATIVE — SIGNIFICANT CHANGE UP

## 2022-03-17 PROCEDURE — 58661 LAPAROSCOPY REMOVE ADNEXA: CPT

## 2022-03-17 PROCEDURE — 88302 TISSUE EXAM BY PATHOLOGIST: CPT

## 2022-03-17 PROCEDURE — 81025 URINE PREGNANCY TEST: CPT

## 2022-03-17 RX ORDER — ASCORBIC ACID 60 MG
1 TABLET,CHEWABLE ORAL
Qty: 0 | Refills: 0 | DISCHARGE

## 2022-03-17 RX ORDER — SODIUM CHLORIDE 9 MG/ML
1000 INJECTION, SOLUTION INTRAVENOUS
Refills: 0 | Status: DISCONTINUED | OUTPATIENT
Start: 2022-03-17 | End: 2022-03-17

## 2022-03-17 RX ORDER — HYDROMORPHONE HYDROCHLORIDE 2 MG/ML
0.5 INJECTION INTRAMUSCULAR; INTRAVENOUS; SUBCUTANEOUS
Refills: 0 | Status: DISCONTINUED | OUTPATIENT
Start: 2022-03-17 | End: 2022-03-17

## 2022-03-17 RX ORDER — OXYCODONE AND ACETAMINOPHEN 5; 325 MG/1; MG/1
1 TABLET ORAL
Qty: 20 | Refills: 0
Start: 2022-03-17

## 2022-03-17 RX ORDER — HYDROMORPHONE HYDROCHLORIDE 2 MG/ML
1 INJECTION INTRAMUSCULAR; INTRAVENOUS; SUBCUTANEOUS
Refills: 0 | Status: DISCONTINUED | OUTPATIENT
Start: 2022-03-17 | End: 2022-03-17

## 2022-03-17 RX ORDER — ONDANSETRON 8 MG/1
4 TABLET, FILM COATED ORAL ONCE
Refills: 0 | Status: COMPLETED | OUTPATIENT
Start: 2022-03-17 | End: 2022-03-17

## 2022-03-17 RX ADMIN — SODIUM CHLORIDE 100 MILLILITER(S): 9 INJECTION, SOLUTION INTRAVENOUS at 08:47

## 2022-03-17 RX ADMIN — ONDANSETRON 4 MILLIGRAM(S): 8 TABLET, FILM COATED ORAL at 12:26

## 2022-03-17 NOTE — ASU DISCHARGE PLAN (ADULT/PEDIATRIC) - NS MD DC FALL RISK RISK
For information on Fall & Injury Prevention, visit: [email or web address] OR  [email or web address] OR  https://www.cdc.gov/steadi/patient.html

## 2022-03-17 NOTE — ASU DISCHARGE PLAN (ADULT/PEDIATRIC) - ASU DC SPECIAL INSTRUCTIONSFT
****Call the office with any problems including but not limited to heavy vaginal bleeding, fevers, severe abdominal pain, inability to eat/drink/urinate  **** Nothing in the vagina x2 weeks-( No sex, tampons, douching )  *****You may shower as usual but no hot tubs, bath tubs, swimming pools x2 weeks..    May take motrin and tylenol as needed for pain.

## 2022-03-17 NOTE — ASU DISCHARGE PLAN (ADULT/PEDIATRIC) - CARE PROVIDER_API CALL
Leanne Oates)  Obstetrics and Gynecology  [address] [place] [address]  Phone: [phone]  Fax: [phone]  Follow Up Time:

## 2022-03-17 NOTE — BRIEF OPERATIVE NOTE - OPERATION/FINDINGS
10 week sized uterus with a 3-4 cm fibroid right anterior. Normal tubes and ovaries 10 week sized uterus with a 3-4 cm fibroid right anterior. Normal tubes and ovaries    [facility] # [id number]

## 2022-03-17 NOTE — BRIEF OPERATIVE NOTE - NSICDXBRIEFPROCEDURE_GEN_ALL_CORE_FT
PROCEDURES:  Laparoscopy with surgical removal of adnexal structure 17-Mar-2022 09:57:46 bilateral salpingectomy for sterilization Leanne Oates

## 2022-03-19 LAB — SURGICAL PATHOLOGY STUDY: SIGNIFICANT CHANGE UP

## 2022-11-01 ENCOUNTER — EMERGENCY (EMERGENCY)
Facility: HOSPITAL | Age: 42
LOS: 1 days | Discharge: HOME | End: 2022-11-01
Attending: EMERGENCY MEDICINE
Payer: COMMERCIAL

## 2022-11-01 VITALS
DIASTOLIC BLOOD PRESSURE: 97 MMHG | RESPIRATION RATE: 16 BRPM | OXYGEN SATURATION: 99 % | TEMPERATURE: 97 F | HEIGHT: 63 IN | SYSTOLIC BLOOD PRESSURE: 154 MMHG | WEIGHT: 160.06 LBS | HEART RATE: 68 BPM

## 2022-11-01 DIAGNOSIS — Z98.890 OTHER SPECIFIED POSTPROCEDURAL STATES: Chronic | ICD-10-CM

## 2022-11-01 LAB
AMPHET UR-MCNC: NEGATIVE — SIGNIFICANT CHANGE UP
APPEARANCE UR: CLEAR — SIGNIFICANT CHANGE UP
APTT BLD: 26.8 SEC — LOW (ref 27.5–35.5)
BACTERIA # UR AUTO: ABNORMAL
BARBITURATES UR SCN-MCNC: NEGATIVE — SIGNIFICANT CHANGE UP
BASOPHILS # BLD AUTO: 0.02 K/UL — SIGNIFICANT CHANGE UP (ref 0–0.2)
BASOPHILS NFR BLD AUTO: 0.2 % — SIGNIFICANT CHANGE UP (ref 0–2)
BENZODIAZ UR-MCNC: NEGATIVE — SIGNIFICANT CHANGE UP
BILIRUB UR-MCNC: NEGATIVE — SIGNIFICANT CHANGE UP
COCAINE METAB.OTHER UR-MCNC: NEGATIVE — SIGNIFICANT CHANGE UP
COLOR SPEC: YELLOW — SIGNIFICANT CHANGE UP
DIFF PNL FLD: ABNORMAL
EOSINOPHIL # BLD AUTO: 0 K/UL — SIGNIFICANT CHANGE UP (ref 0–0.5)
EOSINOPHIL NFR BLD AUTO: 0 % — SIGNIFICANT CHANGE UP (ref 0–6)
EPI CELLS # UR: SIGNIFICANT CHANGE UP
GLUCOSE UR QL: NEGATIVE MG/DL — SIGNIFICANT CHANGE UP
HCT VFR BLD CALC: 40.4 % — SIGNIFICANT CHANGE UP (ref 34.5–45)
HGB BLD-MCNC: 13.4 G/DL — SIGNIFICANT CHANGE UP (ref 11.5–15.5)
IMM GRANULOCYTES NFR BLD AUTO: 0.3 % — SIGNIFICANT CHANGE UP (ref 0–0.9)
INR BLD: 1.13 RATIO — SIGNIFICANT CHANGE UP (ref 0.88–1.16)
KETONES UR-MCNC: ABNORMAL
LEUKOCYTE ESTERASE UR-ACNC: NEGATIVE — SIGNIFICANT CHANGE UP
LYMPHOCYTES # BLD AUTO: 2.73 K/UL — SIGNIFICANT CHANGE UP (ref 1–3.3)
LYMPHOCYTES # BLD AUTO: 29.3 % — SIGNIFICANT CHANGE UP (ref 13–44)
MCHC RBC-ENTMCNC: 28.2 PG — SIGNIFICANT CHANGE UP (ref 27–34)
MCHC RBC-ENTMCNC: 33.2 GM/DL — SIGNIFICANT CHANGE UP (ref 32–36)
MCV RBC AUTO: 84.9 FL — SIGNIFICANT CHANGE UP (ref 80–100)
METHADONE UR-MCNC: NEGATIVE — SIGNIFICANT CHANGE UP
MONOCYTES # BLD AUTO: 0.33 K/UL — SIGNIFICANT CHANGE UP (ref 0–0.9)
MONOCYTES NFR BLD AUTO: 3.5 % — SIGNIFICANT CHANGE UP (ref 2–14)
NEUTROPHILS # BLD AUTO: 6.2 K/UL — SIGNIFICANT CHANGE UP (ref 1.8–7.4)
NEUTROPHILS NFR BLD AUTO: 66.7 % — SIGNIFICANT CHANGE UP (ref 43–77)
NITRITE UR-MCNC: NEGATIVE — SIGNIFICANT CHANGE UP
OPIATES UR-MCNC: NEGATIVE — SIGNIFICANT CHANGE UP
PCP SPEC-MCNC: SIGNIFICANT CHANGE UP
PCP UR-MCNC: NEGATIVE — SIGNIFICANT CHANGE UP
PH UR: 6 — SIGNIFICANT CHANGE UP (ref 5–8)
PLATELET # BLD AUTO: 234 K/UL — SIGNIFICANT CHANGE UP (ref 150–400)
PROT UR-MCNC: NEGATIVE — SIGNIFICANT CHANGE UP
PROTHROM AB SERPL-ACNC: 13.1 SEC — SIGNIFICANT CHANGE UP (ref 10.5–13.4)
RBC # BLD: 4.76 M/UL — SIGNIFICANT CHANGE UP (ref 3.8–5.2)
RBC # FLD: 13.2 % — SIGNIFICANT CHANGE UP (ref 10.3–14.5)
RBC CASTS # UR COMP ASSIST: SIGNIFICANT CHANGE UP /HPF (ref 0–4)
SP GR SPEC: 1.02 — SIGNIFICANT CHANGE UP (ref 1.01–1.02)
THC UR QL: NEGATIVE — SIGNIFICANT CHANGE UP
UROBILINOGEN FLD QL: NEGATIVE MG/DL — SIGNIFICANT CHANGE UP
WBC # BLD: 9.31 K/UL — SIGNIFICANT CHANGE UP (ref 3.8–10.5)
WBC # FLD AUTO: 9.31 K/UL — SIGNIFICANT CHANGE UP (ref 3.8–10.5)
WBC UR QL: SIGNIFICANT CHANGE UP /HPF (ref 0–5)

## 2022-11-01 PROCEDURE — 85610 PROTHROMBIN TIME: CPT

## 2022-11-01 PROCEDURE — 85025 COMPLETE CBC W/AUTO DIFF WBC: CPT

## 2022-11-01 PROCEDURE — 99285 EMERGENCY DEPT VISIT HI MDM: CPT

## 2022-11-01 PROCEDURE — 85730 THROMBOPLASTIN TIME PARTIAL: CPT

## 2022-11-01 PROCEDURE — 80307 DRUG TEST PRSMV CHEM ANLYZR: CPT

## 2022-11-01 PROCEDURE — 99283 EMERGENCY DEPT VISIT LOW MDM: CPT | Mod: 25

## 2022-11-01 PROCEDURE — 84702 CHORIONIC GONADOTROPIN TEST: CPT

## 2022-11-01 PROCEDURE — 96360 HYDRATION IV INFUSION INIT: CPT

## 2022-11-01 PROCEDURE — 0225U NFCT DS DNA&RNA 21 SARSCOV2: CPT

## 2022-11-01 PROCEDURE — 80053 COMPREHEN METABOLIC PANEL: CPT

## 2022-11-01 PROCEDURE — 81001 URINALYSIS AUTO W/SCOPE: CPT

## 2022-11-01 PROCEDURE — 36415 COLL VENOUS BLD VENIPUNCTURE: CPT

## 2022-11-01 RX ORDER — SODIUM CHLORIDE 9 MG/ML
1000 INJECTION INTRAMUSCULAR; INTRAVENOUS; SUBCUTANEOUS ONCE
Refills: 0 | Status: COMPLETED | OUTPATIENT
Start: 2022-11-01 | End: 2022-11-01

## 2022-11-01 RX ADMIN — SODIUM CHLORIDE 1000 MILLILITER(S): 9 INJECTION INTRAMUSCULAR; INTRAVENOUS; SUBCUTANEOUS at 22:57

## 2022-11-01 NOTE — ED STATDOCS - PATIENT PORTAL LINK FT
You can access the FollowMyHealth Patient Portal offered by [facility] by registering at the following website: [email or web address]. By joining [employer]’s FollowMyHealth portal, you will also be able to view your health information using other applications (apps) compatible with our system.

## 2022-11-01 NOTE — ED STATDOCS - ADDITIONAL NOTES AND INSTRUCTIONS:
PT was evaluated At [facility] ED and was found to have a condition that warranted time of to rest and heal from WORK/SCHOOL.   Harpal Gao PA-C
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
## 2022-11-01 NOTE — ED STATDOCS - ATTENDING APP SHARED VISIT CONTRIBUTION OF CARE
I, Lamont Harrison, performed the initial face to face bedside interview with this patient regarding history of present illness, review of symptoms and relevant past medical, social and family history.  I completed an independent physical examination.  I was the initial provider who evaluated this patient. I have signed out the follow up of any pending tests (i.e. labs, radiological studies) to the ACP.  I have communicated the patient’s plan of care and disposition with the ACP.  The history, relevant review of systems, past medical and surgical history, medical decision making, and physical examination was documented by the scribe in my presence and I attest to the accuracy of the documentation.
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
## 2022-11-01 NOTE — ED STATDOCS - NSFOLLOWUPINSTRUCTIONS_ED_ALL_ED_FT
Fatigue      If you have fatigue, you feel tired all the time and have a lack of energy or a lack of motivation. Fatigue may make it difficult to start or complete tasks because of exhaustion. In general, occasional or mild fatigue is often a normal response to activity or life. However, long-lasting (chronic) or extreme fatigue may be a symptom of a medical condition.      Follow these instructions at home:    General instructions     •Watch your fatigue for any changes.      •Go to bed and get up at the same time every day.      •Avoid fatigue by pacing yourself during the day and getting enough sleep at night.      •Maintain a healthy weight.      Medicines     •Take over-the-counter and prescription medicines only as told by your health care provider.      •Take a multivitamin, if told by your health care provider.       • Do not use herbal or dietary supplements unless they are approved by your health care provider.        Activity      •Exercise regularly, as told by your health care provider.      •Use or practice techniques to help you relax, such as yoga, dali chi, meditation, or massage therapy.        Eating and drinking      •Avoid heavy meals in the evening.      •Eat a well-balanced diet, which includes lean proteins, whole grains, plenty of fruits and vegetables, and low-fat dairy products.      •Avoid consuming too much caffeine.      •Avoid the use of alcohol.      •Drink enough fluid to keep your urine pale yellow.      Lifestyle     •Change situations that cause you stress. Try to keep your work and personal schedule in balance.      • Do not use any products that contain nicotine or tobacco, such as cigarettes and e-cigarettes. If you need help quitting, ask your health care provider.      • Do not use drugs.        Contact a health care provider if:    •Your fatigue does not get better.      •You have a fever.      •You suddenly lose or gain weight.      •You have headaches.      •You have trouble falling asleep or sleeping through the night.      •You feel angry, guilty, anxious, or sad.      •You are unable to have a bowel movement (constipation).      •Your skin is dry.      •You have swelling in your legs or another part of your body.        Get help right away if:    •You feel confused.      •Your vision is blurry.      •You feel faint or you pass out.      •You have a severe headache.      •You have severe pain in your abdomen, your back, or the area between your waist and hips (pelvis).      •You have chest pain, shortness of breath, or an irregular or fast heartbeat.      •You are unable to urinate, or you urinate less than normal.      •You have abnormal bleeding, such as bleeding from the rectum, vagina, nose, lungs, or nipples.      •You vomit blood.      •You have thoughts about hurting yourself or others.      If you ever feel like you may hurt yourself or others, or have thoughts about taking your own life, get help right away. You can go to your nearest emergency department or call:   • Your local emergency services (911 in the [place]       • A suicide crisis helpline, such as the National Suicide Prevention Lifeline at 1-100.439.9766 or 854 in the [place] This is open 24 hours a day.         Summary    •If you have fatigue, you feel tired all the time and have a lack of energy or a lack of motivation.      •Fatigue may make it difficult to start or complete tasks because of exhaustion.      •Long-lasting (chronic) or extreme fatigue may be a symptom of a medical condition.      •Exercise regularly, as told by your health care provider.      •Change situations that cause you stress. Try to keep your work and personal schedule in balance.      This information is not intended to replace advice given to you by your health care provider. Make sure you discuss any questions you have with your health care provider.

## 2022-11-01 NOTE — ED STATDOCS - PROGRESS NOTE DETAILS
Harpal Gao PA-C: Pt discussed at length with attending HPI/ROS/PE confirmed, Pt seen resting comfortable in no acute distress in chair.   PLAN: PT with stable VS, no acute distress, non toxic appearing, tolerating PO in the ED, Pt with no acute findings, improvement of symptoms Pt educated that she is low risk for DC home with supportive care, follow up to PCP, educated about when to return to the ED if needed. PT verbalizes that he understands all instructions and results. Pt informed that ED is open and available 24/7 365 days a yr, encouraged to return to the ED if they have any change in condition, or feel the need for revaluation.
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
## 2022-11-02 LAB
ALBUMIN SERPL ELPH-MCNC: 4.4 G/DL — SIGNIFICANT CHANGE UP (ref 3.3–5.2)
ALP SERPL-CCNC: 55 U/L — SIGNIFICANT CHANGE UP (ref 40–120)
ALT FLD-CCNC: 12 U/L — SIGNIFICANT CHANGE UP
ANION GAP SERPL CALC-SCNC: 13 MMOL/L — SIGNIFICANT CHANGE UP (ref 5–17)
APAP SERPL-MCNC: <3 UG/ML — LOW (ref 10–26)
AST SERPL-CCNC: 17 U/L — SIGNIFICANT CHANGE UP
BILIRUB SERPL-MCNC: 0.5 MG/DL — SIGNIFICANT CHANGE UP (ref 0.4–2)
BUN SERPL-MCNC: 8.4 MG/DL — SIGNIFICANT CHANGE UP (ref 8–20)
CALCIUM SERPL-MCNC: 9.3 MG/DL — SIGNIFICANT CHANGE UP (ref 8.4–10.5)
CHLORIDE SERPL-SCNC: 100 MMOL/L — SIGNIFICANT CHANGE UP (ref 96–108)
CO2 SERPL-SCNC: 24 MMOL/L — SIGNIFICANT CHANGE UP (ref 22–29)
CREAT SERPL-MCNC: 0.63 MG/DL — SIGNIFICANT CHANGE UP (ref 0.5–1.3)
EGFR: 114 ML/MIN/1.73M2 — SIGNIFICANT CHANGE UP
ETHANOL SERPL-MCNC: <10 MG/DL — SIGNIFICANT CHANGE UP (ref 0–9)
GLUCOSE SERPL-MCNC: 92 MG/DL — SIGNIFICANT CHANGE UP (ref 70–99)
HCG SERPL-ACNC: <4 MIU/ML — SIGNIFICANT CHANGE UP
POTASSIUM SERPL-MCNC: 4 MMOL/L — SIGNIFICANT CHANGE UP (ref 3.5–5.3)
POTASSIUM SERPL-SCNC: 4 MMOL/L — SIGNIFICANT CHANGE UP (ref 3.5–5.3)
PROT SERPL-MCNC: 7.4 G/DL — SIGNIFICANT CHANGE UP (ref 6.6–8.7)
RAPID RVP RESULT: SIGNIFICANT CHANGE UP
SALICYLATES SERPL-MCNC: <0.6 MG/DL — LOW (ref 10–20)
SARS-COV-2 RNA SPEC QL NAA+PROBE: SIGNIFICANT CHANGE UP
SODIUM SERPL-SCNC: 137 MMOL/L — SIGNIFICANT CHANGE UP (ref 135–145)
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
## 2022-12-13 NOTE — ASU PATIENT PROFILE, ADULT - DOES PATIENT HAVE ADVANCE DIRECTIVE
[Time Spent: ___ minutes] : I have spent [unfilled] minutes of time on the encounter. brother Antonio Cotton [phone]/Yes

## 2023-04-20 NOTE — ASU PREOP CHECKLIST - NS PREOP CHK TEST_COVID RESULT_GEN_ALL_CORE
Enrolled with [employer] - Ordered and being shipped to patient's home address on file. ETA within 5-7 business days. Message  Received: Hannah Matt         Previous Messages       ----- Message -----   From: Junior Calle   Sent: 4/11/2023   2:14 PM EDT   To: Ansley Miller.  Mone Castellanos just ordered a 3 days Holter monitor for palpitations-afib. thanks
Negative

## 2023-05-23 NOTE — H&P PST ADULT - SKIN
No lesions; no rash

## 2023-09-08 NOTE — ED ADULT NURSE NOTE - WILL THE PATIENT ACCEPT THE PFIZER COVID-19 VACCINE IF ELIGIBLE AND IT IS AVAILABLE?
Pt arrived in room 7211 via cart. Pt ambulated from cart to bed. Four eyes done with BENJAMIN Kevin.  Pt oriented to call light and will call appropriately. Will continue to monitor during shift.    No

## 2023-12-26 ENCOUNTER — OFFICE (OUTPATIENT)
Dept: URBAN - METROPOLITAN AREA CLINIC 63 | Facility: CLINIC | Age: 43
Setting detail: OPHTHALMOLOGY
End: 2023-12-26
Payer: COMMERCIAL

## 2023-12-26 DIAGNOSIS — H11.153: ICD-10-CM

## 2023-12-26 PROBLEM — H52.4 PRESBYOPIA: Status: ACTIVE | Noted: 2023-12-26

## 2023-12-26 PROCEDURE — 92014 COMPRE OPH EXAM EST PT 1/>: CPT | Performed by: STUDENT IN AN ORGANIZED HEALTH CARE EDUCATION/TRAINING PROGRAM

## 2023-12-26 ASSESSMENT — SPHEQUIV_DERIVED
OS_SPHEQUIV: 0.125
OD_SPHEQUIV: -0.125

## 2023-12-26 ASSESSMENT — CONFRONTATIONAL VISUAL FIELD TEST (CVF)
OD_FINDINGS: FULL
OS_FINDINGS: FULL

## 2023-12-26 ASSESSMENT — REFRACTION_AUTOREFRACTION
OS_SPHERE: +0.25
OD_SPHERE: 0.00
OD_AXIS: 084
OS_CYLINDER: -0.25
OD_CYLINDER: -0.25
OS_AXIS: 136

## 2024-01-12 NOTE — ASU PREOP CHECKLIST - WARM FLUIDS/WARM BLANKETS
"[facility] OR FOLLOW UP VISIT    Patient: Hailey Peng YOB: 1985    Date of Exam: 1/12/24    Arrival Time: 09 33 AM  Departure Time: 10 50 AM    Charge Phone (written on paperwork):  [phone]    Please be advised that this client resides in a facility in which narcotic medications are not permitted. If pain management is needed, please prescribe an alternative medication.     Hailey ePng is a 38 year old who presents for the following: Sore throat and vaginal discharge.    HPI:      38-year-old female with past medical history anxiety/depression (follows [facility] for mental health management) presents to clinic to discuss sore throat and vaginal discharge concern.  [facility] for past methamphetamine use.  Patient denies history of IV drug use.  She has been sober for 65 days.    Sore throat-patient reports 2-day history of sore throat.  Associated symptoms include fatigue, fever (Tmax \"99 something\" per patient), congestion, and sinus pressure.  Patient reports she initially had a cough that has resolved.  She took a COVID test at [facility] and was negative.  She denies previous COVID infection or vaccination.    Vaginal discharge-patient reports she was diagnosed and treated with trichomoniasis while in care home approx. 3 weeks ago.  She had creamy discharge at the time of diagnosis.  She reports she took a 14-day course of metronidazole which ended last week.  Patient reports this week she developed thick/\"chalky\" discharge with a foul smell.  She denies vaginal itching, pelvic pain, or vaginal pain.  She denies new sexual encounters since her trichomoniasis diagnosis.  She is unsure of what STI she was tested for while in care home. No other acute concerns/symptoms at time of exam.          Do you need any refills on your Medications today? No    Review of Systems   Constitutional: Positive for fatigue and fever. Negative " "for chills.   HENT: Positive for congestion, sinus pressure and sore throat.    Respiratory: Negative for cough (Resolved) and shortness of breath.    Cardiovascular: Negative for chest pain and palpitations.   Gastrointestinal: Negative for abdominal pain, diarrhea, nausea and vomiting.   Genitourinary: Positive for vaginal discharge. Negative for pelvic pain and vaginal pain.   Constitutional, HEENT, cardiovascular, pulmonary, gi and gu systems are negative, except as otherwise noted.        Past Medical History:   Diagnosis Date    Anxiety     Depressive disorder        No past surgical history on file.    Family History   Problem Relation Age of Onset    Diabetes No family hx of     Heart Disease No family hx of     Hypertension No family hx of     Hyperlipidemia No family hx of     Cancer No family hx of        Social History     Tobacco Use    Smoking status: Former     Types: Cigarettes    Smokeless tobacco: Never   Substance Use Topics    Alcohol use: Not Currently         1/12/2024     9:42 AM   PHQ   PHQ-9 Total Score 10   Q9: Thoughts of better off dead/self-harm past 2 weeks Not at all         1/12/2024     9:42 AM   ROSIBEL-7 SCORE   Total Score 8           General Physical Exam:  Vitals: /79 (BP Location: Left arm, Patient Position: Sitting, Cuff Size: Adult Regular)   Pulse 73   Temp 98.6  F (37  C) (Oral)   Resp 16   Ht 1.676 m (5' 6\")   Wt 106.8 kg (235 lb 6.4 oz)   SpO2 96%   BMI 37.99 kg/m        Physical Exam  Constitutional:       General: She is not in acute distress.     Appearance: She is ill-appearing.   HENT:      Right Ear: Tympanic membrane normal.      Left Ear:  No middle ear effusion. Tympanic membrane is erythematous (Mild). Tympanic membrane is not bulging.      Ears:      Comments: R EAC with mild erythema/irritation.     Nose: Congestion present. No rhinorrhea.      Mouth/Throat:      Pharynx: Posterior oropharyngeal erythema (Mild) present. No oropharyngeal exudate. "   Eyes:      Extraocular Movements: Extraocular movements intact.      Pupils: Pupils are equal, round, and reactive to light.   Cardiovascular:      Rate and Rhythm: Normal rate and regular rhythm.      Heart sounds: Normal heart sounds. No murmur heard.  Pulmonary:      Effort: Pulmonary effort is normal. No respiratory distress.      Breath sounds: Normal breath sounds. No wheezing or rales.   Abdominal:      General: Bowel sounds are normal.      Palpations: Abdomen is soft.      Tenderness: There is no abdominal tenderness.   Genitourinary:     Comments: Deferred by patient.  Musculoskeletal:      Cervical back: Neck supple.      Right lower leg: No edema.      Left lower leg: No edema.   Lymphadenopathy:      Cervical: No cervical adenopathy.   Neurological:      General: No focal deficit present.      Mental Status: She is alert.   Psychiatric:         Thought Content: Thought content normal.         Judgment: Judgment normal.       Recent Results (from the past 2 hour(s))   Streptococcus A Rapid Screen w/Reflex to PCR    Collection Time: 01/12/24  9:45 AM    Specimen: Throat; Swab   Result Value Ref Range    Group A Strep antigen Negative Negative           Additional Comments:N/A    Assessment / Plan:  1. Throat pain  38-year-old female with 2-day history of sore throat.  Patient is currently afebrile, ill-appearing, however, in no acute distress.  She has mild oropharyngeal erythema and mild erythema to right TM without bulging or middle ear effusion.  She endorses Q-tip use, advised her to discontinue.  Her rapid strep is negative, will check PCR.  Will also check COVID PCR.  If testing negative, advised patient most cases of pharyngitis are viral and resolve in 10 days.  Will provide prescription for ibuprofen for symptomatic relief (patient denies history of gastric ulcers or CKD). Disposition pending results.    - Streptococcus A Rapid Screen w/Reflex to PCR  - Group A Streptococcus PCR Throat Swab  -  "Symptomatic COVID-19 Virus (Coronavirus) by PCR  - ibuprofen (ADVIL/MOTRIN) 200 MG tablet; Take 2 tablets (400 mg) by mouth every 6 hours as needed for pain or moderate pain  Dispense: 30 tablet; Refill: 1    2. Vaginal discharge  Patient reports increased vaginal discharge that is described as thick/\"chalky\" with foul smell after recently completing metronidazole for trichomoniasis infection.  Patient denies vaginal itching, however, advised patient that she could have a yeast infection considering recent antibiotic use.  Patient declined  exam today.  Obtained chlamydia, gonorrhea, and multiplex vaginal panel labs via self swab. Disposition pending results.  - CHLAMYDIA TRACHOMATIS PCR  - NEISSERIA GONORRHOEA PCR  - Multiplex Vaginal Panel by PCR    3. Routine screening for STI (sexually transmitted infection)  - HIV Antigen Antibody Combo  - Treponema Abs w Reflex to RPR and Titer    4. Methamphetamine use (H)  Currently at [facility]. Defer MH to patient's psych provider ([facility]).  - CBC with platelets differential  - Comprehensive metabolic panel      Referrals Made:   NO REFERRALS MADE TODAY  If a referral was made to a [facility] and you don't get a call from central scheduling please call [phone].  If a Mammogram was ordered for you at [facility] call [phone] to schedule or change your appointment.  If you had an XRay/CT/Ultrasound/MRI ordered the number is [phone] to schedule or change your radiology appointment.     Follow up as needed or if symptoms worsen/fail to improve after 10 days. Disposition pending results.      Medication changes made at today's visit: MEDICATIONS:   Orders Placed This Encounter   Medications                                                 ibuprofen (ADVIL/MOTRIN) 200 MG tablet     Sig: Take 2 tablets (400 mg) by mouth every 6 hours as needed for pain or moderate pain     Dispense:  30 tablet     Refill:  1          - Continue other " medications without change    All questions/concerns addressed. Patient stated understanding/agreement to plan of care.    CHRIS Berg, CNP  [facility] [employer]    Note: Chart documentation was done in part with Dragon Voice Recognition software.  Although reviewed after completion, some word and grammatical errors may remain. Please contact author for any clarification or concerns.         no

## 2024-03-15 ENCOUNTER — OUTPATIENT (OUTPATIENT)
Dept: OUTPATIENT SERVICES | Facility: HOSPITAL | Age: 44
LOS: 1 days | End: 2024-03-15
Payer: COMMERCIAL

## 2024-03-15 ENCOUNTER — APPOINTMENT (OUTPATIENT)
Dept: NEUROSURGERY | Facility: CLINIC | Age: 44
End: 2024-03-15
Payer: COMMERCIAL

## 2024-03-15 VITALS
HEART RATE: 61 BPM | OXYGEN SATURATION: 97 % | RESPIRATION RATE: 18 BRPM | BODY MASS INDEX: 32.82 KG/M2 | TEMPERATURE: 97.4 F | WEIGHT: 197 LBS | DIASTOLIC BLOOD PRESSURE: 86 MMHG | SYSTOLIC BLOOD PRESSURE: 123 MMHG | HEIGHT: 65 IN

## 2024-03-15 PROCEDURE — 72100 X-RAY EXAM L-S SPINE 2/3 VWS: CPT

## 2024-03-15 PROCEDURE — 99214 OFFICE O/P EST MOD 30 MIN: CPT

## 2024-03-15 PROCEDURE — 72100 X-RAY EXAM L-S SPINE 2/3 VWS: CPT | Mod: 26

## 2024-03-25 NOTE — HISTORY OF PRESENT ILLNESS
[de-identified] : 43year old female initially presented to Dr. Ayers with a herniated L5-S1 disc but elected not to undergo surgical treatment.    S/p Lumbar laminectomy, facetectomy, foraminotomy L45 and diskectomy L45 4/8/2021 with Dr. Ayers.  Comes in today with complaints of low back pain with radiation to the legs

## 2024-03-25 NOTE — ASSESSMENT
[FreeTextEntry1] : Lumbar xrays and prior MRIs reveiwed which demonstrate spondylosis  Proceed with MRI lumbar spine to eval for re-herniation of disc RTC after MRI  I, Dr. Ross, personally performed the evaluation and management (E/M) services for this new patient.  That E/M includes conducting the initial examination, assessing all conditions, and establishing the plan of care.  Today, my ACP, Faith Brambila, was here to observe my evaluation and management services for this patient to be followed going forward.    Patient and patient's family verbalize agreement and understanding with plan of care.

## 2024-05-10 PROBLEM — M54.16 LUMBAR RADICULOPATHY, RIGHT: Status: ACTIVE | Noted: 2021-03-11

## 2024-05-10 PROBLEM — Z98.890 S/P LUMBAR LAMINECTOMY: Status: ACTIVE | Noted: 2021-05-19

## 2024-05-13 ENCOUNTER — APPOINTMENT (OUTPATIENT)
Dept: NEUROSURGERY | Facility: CLINIC | Age: 44
End: 2024-05-13
Payer: COMMERCIAL

## 2024-05-13 VITALS
BODY MASS INDEX: 32.82 KG/M2 | SYSTOLIC BLOOD PRESSURE: 121 MMHG | HEART RATE: 71 BPM | RESPIRATION RATE: 18 BRPM | WEIGHT: 197 LBS | DIASTOLIC BLOOD PRESSURE: 88 MMHG | HEIGHT: 65 IN

## 2024-05-13 DIAGNOSIS — M54.16 RADICULOPATHY, LUMBAR REGION: ICD-10-CM

## 2024-05-13 DIAGNOSIS — Z98.890 OTHER SPECIFIED POSTPROCEDURAL STATES: ICD-10-CM

## 2024-05-13 DIAGNOSIS — G56.20 LESION OF ULNAR NERVE, UNSPECIFIED UPPER LIMB: ICD-10-CM

## 2024-05-13 PROCEDURE — 99215 OFFICE O/P EST HI 40 MIN: CPT

## 2024-05-13 NOTE — PHYSICAL EXAM
[General Appearance - Alert] : alert [General Appearance - In No Acute Distress] : in no acute distress [General Appearance - Well Nourished] : well nourished [General Appearance - Well-Appearing] : healthy appearing [] : normal voice and communication [Oriented To Time, Place, And Person] : oriented to person, place, and time [Impaired Insight] : insight and judgment were intact [Affect] : the affect was normal [Memory Recent] : recent memory was not impaired [5] : C8 finger flexors 5/5 [Abnormal Walk] : normal gait [Balance] : balance was intact

## 2024-05-13 NOTE — ASSESSMENT
[FreeTextEntry1] : chronic b/l hand/arm pain/numbness as well as lower back to LE radiculopathy. Further diagnostic test is recommended prior to any surgery plan.  PLAN - EMG upper/lower extremities (also r/o ulnar nerve neuropathy as well as CTS for upper extremities) - MRI L-spine wo (scheduled this Thursday) - f/u after EMG/MRI to review for further treatment

## 2024-05-13 NOTE — REASON FOR VISIT
[Follow-Up: _____] : a [unfilled] follow-up visit [FreeTextEntry1] : h/o L4-5 posterior decompression/diskectomy with Dr. Ayers in 2021 (pending MRI L-spine, EMG LE for failed back syndrome) returns c/o significantly worsening b/l hand/arm pain. She has been diagnosed b/l CTS (moderate to severe, last EMG in 2021 by Dr. Jevon Townsend) who has been treated with multiple steroid injections, PT, NSAIDs without significant relief. She now c/o severe burning/shooting pain on her b/l wrist/forearm with numbness on b/l 3-5 digit worsening at night time. She denies weakness on upper extremities, BB dysfunction, balance problem.

## 2024-05-13 NOTE — REVIEW OF SYSTEMS
[Numbness] : numbness [Tingling] : tingling [As Noted in HPI] : as noted in HPI [Negative] : Genitourinary

## 2024-05-13 NOTE — DATA REVIEWED
[de-identified] : EMG on b/l upper extremities on 9/21/2021 showed moderate to severe bilateral carpal tunnel syndrome

## 2024-05-13 NOTE — HISTORY OF PRESENT ILLNESS
[FreeTextEntry1] : b/l CTS [de-identified] : INITIAL visit (3/15/2024) 43year old female initially presented to Dr. Ayers with a herniated L5-S1 disc but elected not to undergo surgical treatment.    S/p Lumbar laminectomy, facetectomy, foraminotomy L45 and diskectomy L45 4/8/2021 with Dr. Ayers.  Comes in today with complaints of low back pain with radiation to the legs. Xray LS today showed lumar spondylosis, plan was made to get MRI L-spine and EMG return after.

## 2024-05-16 ENCOUNTER — OUTPATIENT (OUTPATIENT)
Dept: OUTPATIENT SERVICES | Facility: HOSPITAL | Age: 44
LOS: 1 days | End: 2024-05-16
Payer: COMMERCIAL

## 2024-05-16 ENCOUNTER — APPOINTMENT (OUTPATIENT)
Dept: MRI IMAGING | Facility: HOSPITAL | Age: 44
End: 2024-05-16

## 2024-05-16 PROCEDURE — 72148 MRI LUMBAR SPINE W/O DYE: CPT | Mod: 26

## 2024-05-16 PROCEDURE — 72148 MRI LUMBAR SPINE W/O DYE: CPT

## 2024-06-10 PROBLEM — M48.07 FORAMINAL STENOSIS OF LUMBOSACRAL REGION: Status: ACTIVE | Noted: 2024-06-10

## 2024-06-10 PROBLEM — Z86.2 HISTORY OF ANEMIA: Status: RESOLVED | Noted: 2020-06-05 | Resolved: 2024-06-10

## 2024-06-10 PROBLEM — M51.26 LUMBAR HERNIATED DISC: Status: ACTIVE | Noted: 2020-06-16

## 2024-06-11 ENCOUNTER — APPOINTMENT (OUTPATIENT)
Dept: NEUROSURGERY | Facility: CLINIC | Age: 44
End: 2024-06-11
Payer: COMMERCIAL

## 2024-06-11 DIAGNOSIS — Z86.2 PERSONAL HISTORY OF DISEASES OF THE BLOOD AND BLOOD-FORMING ORGANS AND CERTAIN DISORDERS INVOLVING THE IMMUNE MECHANISM: ICD-10-CM

## 2024-06-11 DIAGNOSIS — G56.03 CARPAL TUNNEL SYNDROM,BILATERAL UPPER LIMBS: ICD-10-CM

## 2024-06-11 DIAGNOSIS — M51.26 OTHER INTERVERTEBRAL DISC DISPLACEMENT, LUMBAR REGION: ICD-10-CM

## 2024-06-11 DIAGNOSIS — M48.07 SPINAL STENOSIS, LUMBOSACRAL REGION: ICD-10-CM

## 2024-06-11 PROCEDURE — 99214 OFFICE O/P EST MOD 30 MIN: CPT

## 2024-06-11 NOTE — DATA REVIEWED
[de-identified] : L-spine wo on 5/16/24 in PACS ACC: [id number]     EXAM:  MR SPINE LUMBAR   ORDERED BY: JASVIR MAYERS  PROCEDURE DATE:  05/16/2024    INTERPRETATION:  CLINICAL INFORMATION: Bilateral lower extremity radiculopathy.  ADDITIONAL CLINICAL INFORMATION: Spondylolisthesis M43.16  TECHNIQUE: Multiplanar, multisequence MRI was performed of the lumbar spine. IV Contrast: NONE  PRIOR STUDIES: Comparison is made to a prior MRI of the lumbar spine performed at an outside institution on 2/23/2022.  FINDINGS: Counting from C2 through L5/S1 utilizing the  images, there is sacralization of the L5 vertebral body. The largest most inferior disc space has been annotated as the L4/L5 disc space. There is a partial disc space annotated as the L5/S1 disc space seen inferior to the previous disc space. If surgical manipulation is contemplated, would recommend comparison of the study to plain films of the entire spine for counting purposes.  There is trace retrolisthesis of L4 on L5. Otherwise there is normal curvature to the lumbar lordosis. The vertebral bodies are normal height. There is mild-to-moderate loss of disc height and T2 signal at the L3/L4 and L4/L5 disc spaces consistent with desiccation.. The remaining intervertebral disc spaces are within normal limits. The conus terminates at the T12/L1 level and demonstrates no evidence of abnormal signal changes.  Evaluation of the individual levels demonstrates at the L5/S1 level there is no evidence of focal disc herniation or spinal canal stenosis. The bilateral neuroforamen appear patent.  At the L4/L5 level there is a mild diffuse disc bulge flattening the ventral thecal sac and mildly compressing the left L4 foraminal exiting nerve root and in contacting the left descending L5 nerve root, unchanged. There is left lateral recess narrowing. There is severe left foraminal narrowing. There is mild-to-moderate right foraminal narrowing.The right L4 foraminal exiting nerve root is within normal limits. There is moderate to severe facet and ligamentous hypertrophy. There is no evidence of spinal canal stenosis.  At the L3/L4 level there is a mild diffuse disc bulge in close proximity to the right L3 foraminal exiting nerve root. There has been interval decrease in size of a superimposed right paracentral and lateral recess disc protrusion when compared to the prior study. There is narrowing of the right lateral recess that has improved when compared to the prior study. There is moderate left and moderate to severe right foraminal narrowing. There is no evidence of spinal canal stenosis. There is been decompression at this level.  The remaining levels demonstrate no evidence of a focal disc herniation. The bilateral neuroforamen are patent. There is no evidence of spinal canal stenosis.  Impression:  SACRALISATION OF THE L5 VERTEBRAL BODY. THE LARGEST MOST INFERIOR DISC SPACE HAS BEEN ANNOTATED AS THE L4/L5 DISC SPACE. THERE IS A PARTIAL DISC SPACE ANNOTATED AS THE L5/S1 DISC SPACE SEEN INFERIOR TO THE PREVIOUS DISC SPACE. IF SURGICAL MANIPULATION IS CONTEMPLATED, WOULD RECOMMEND COMPARISON OF THE STUDY TO PLAIN FILMS OF THE ENTIRE SPINE FOR COUNTING PURPOSES.  There is a right L4 laminotomy.  Trace retrolisthesis of L4 on L5, unchanged.  L4/L5  mild diffuse disc bulge  mildly compressing the left L4 foraminal exiting nerve root and in contacting the left descending L5 nerve root, unchanged. L4/L5 no evidence of spinal canal stenosis.  L3/L4 mild diffuse disc bulge in close proximity to the right L3 foraminal exiting nerve root with interval decrease in size of a superimposed right paracentral and lateral recess disc protrusion when compared to the prior study. There is been decompression at this level.   --- End of Report ---      MARINO UMANA MD; Attending Radiologist This document has been electronically signed. May 16 2024  1:58PM

## 2024-06-11 NOTE — PHYSICAL EXAM
[General Appearance - Alert] : alert [General Appearance - In No Acute Distress] : in no acute distress [] : normal voice and communication [General Appearance - Well Nourished] : well nourished [Oriented To Time, Place, And Person] : oriented to person, place, and time [Impaired Insight] : insight and judgment were intact [Affect] : the affect was normal [Memory Recent] : recent memory was not impaired

## 2024-06-11 NOTE — ASSESSMENT
[FreeTextEntry1] : MRI L-spine was reviewed which showed stable post op, no neurosurgical intervention is recommended for lumbar spine. Given 2021 EMG finding of CTS with her worsening symptoms, carpal tunnel release surgery can be beneficial. Patient would like to obtain new EMG prior to discuss any surgery.  PLAN - PT for persistent back to LE pain - complete EMG upper/lower extremities - f/u after EMG (possible b/l CTR plan)

## 2024-06-11 NOTE — REASON FOR VISIT
[Follow-Up: _____] : a [unfilled] follow-up visit [Home] : at home, [unfilled] , at the time of the visit. [Medical Office: ([facility])___] : at the medical office located in  [Patient] : the patient [FreeTextEntry1] : Recent office visit for b/l arms/hands pain/numbness (previous EMG in 2021 confirmed b/l CTS) and failed back syndrome c/o lower back to LE radiculopathy (h/o L4-5 posterior decompression in 2021 with Dr. Ayers) . She returns to review MR L-spine (PACS) wo. pending updated EMG test due to difficulty finding in network provider.

## 2024-06-11 NOTE — HISTORY OF PRESENT ILLNESS
[FreeTextEntry1] : b/l CTS [de-identified] : INITIAL visit (3/15/2024) 43year old female initially presented to Dr. Ayers with a herniated L5-S1 disc but elected not to undergo surgical treatment.    S/p Lumbar laminectomy, facetectomy, foraminotomy L45 and diskectomy L45 4/8/2021 with Dr. Ayers.  Comes in today with complaints of low back pain with radiation to the legs. Xray LS today showed lumar spondylosis, plan was made to get MRI L-spine and EMG return after.  5/13/24 visit h/o L4-5 posterior decompression/diskectomy with Dr. Ayers in 2021 (pending MRI L-spine, EMG LE for failed back syndrome) returns c/o significantly worsening b/l hand/arm pain. She has been diagnosed b/l CTS (moderate to severe, last EMG in 2021 by Dr. Jevon Townsend) who has been treated with multiple steroid injections, PT, NSAIDs without significant relief. She now c/o severe burning/shooting pain on her b/l wrist/forearm with numbness on b/l 3-5 digit worsening at night time. She denies weakness on upper extremities, BB dysfunction, balance problem. Plan was made to obtain EMG upper/lower extremities, MRI L-spine wo and return after

## 2024-08-26 NOTE — H&P PST ADULT - FUNCTIONAL ASSESSMENT - BASIC MOBILITY 5.
TAQUERIA was 12/12/23. Last mammo 11/20/23. New Order placed in chart. Pt is aware.    4 = No assist / stand by assistance